# Patient Record
Sex: FEMALE | Race: WHITE | NOT HISPANIC OR LATINO | Employment: PART TIME | URBAN - METROPOLITAN AREA
[De-identification: names, ages, dates, MRNs, and addresses within clinical notes are randomized per-mention and may not be internally consistent; named-entity substitution may affect disease eponyms.]

---

## 2024-04-23 ENCOUNTER — TELEPHONE (OUTPATIENT)
Dept: PRIMARY CARE | Facility: CLINIC | Age: 59
End: 2024-04-23

## 2024-04-23 ENCOUNTER — APPOINTMENT (OUTPATIENT)
Dept: OBSTETRICS AND GYNECOLOGY | Facility: CLINIC | Age: 59
End: 2024-04-23

## 2024-04-23 ENCOUNTER — OFFICE VISIT (OUTPATIENT)
Dept: PRIMARY CARE | Facility: CLINIC | Age: 59
End: 2024-04-23
Payer: COMMERCIAL

## 2024-04-23 VITALS
SYSTOLIC BLOOD PRESSURE: 120 MMHG | OXYGEN SATURATION: 97 % | TEMPERATURE: 97.3 F | WEIGHT: 270.2 LBS | HEART RATE: 76 BPM | DIASTOLIC BLOOD PRESSURE: 74 MMHG

## 2024-04-23 DIAGNOSIS — N93.9 VAGINAL BLEEDING: Primary | ICD-10-CM

## 2024-04-23 PROCEDURE — 1036F TOBACCO NON-USER: CPT | Performed by: NURSE PRACTITIONER

## 2024-04-23 PROCEDURE — 99203 OFFICE O/P NEW LOW 30 MIN: CPT | Performed by: NURSE PRACTITIONER

## 2024-04-23 RX ORDER — PROPRANOLOL HYDROCHLORIDE 10 MG/1
TABLET ORAL
COMMUNITY
Start: 2013-11-25

## 2024-04-23 RX ORDER — LURASIDONE HYDROCHLORIDE 80 MG/1
TABLET, FILM COATED ORAL
COMMUNITY

## 2024-04-23 ASSESSMENT — ENCOUNTER SYMPTOMS
FREQUENCY: 0
MYALGIAS: 0
CHILLS: 0
RECTAL PAIN: 0
RESPIRATORY NEGATIVE: 1
FEVER: 0
DIARRHEA: 0
VOMITING: 0
CARDIOVASCULAR NEGATIVE: 1
CONSTIPATION: 0
HEMATURIA: 0
APPETITE CHANGE: 0
ABDOMINAL PAIN: 0
DYSURIA: 0
HEADACHES: 0
NAUSEA: 0

## 2024-04-23 NOTE — TELEPHONE ENCOUNTER
noted    ----- Message from Mallory Medina sent at 4/23/2024 12:22 PM EDT -----  Rescheduled for 5/2/24 at 8:30am with Lance Hardin.  Called patient and let her know information.  ----- Message -----  From: ALVERTO Mcdonough-CNP  Sent: 4/23/2024  12:08 PM EDT  To: Mallory Medina    They cancelled that appt for today. Can you please schedule her for Roma Hardin next week?

## 2024-04-23 NOTE — PROGRESS NOTES
Subjective   Patient ID: Kavya Stout is a 59 y.o. female who presents for Vaginal Bleeding (Hasn't had a period in 10 years- first time spotting).    Patient states that she noticed a few drops of blood on her panties after going to the bathroom. It has been steady since then. She has needed to put a thin pad on her underwear. Pt changes the pad once a day. No cramping. No recent sexual activity. Patient has no urinary symptoms. Patient reports that she is feeling well otherwise. No family history of any GYN cancers.     Patient's last period was ten years ago.     Review of Systems   Constitutional:  Negative for appetite change, chills and fever.   HENT: Negative.     Respiratory: Negative.     Cardiovascular: Negative.    Gastrointestinal:  Negative for abdominal pain, constipation, diarrhea, nausea, rectal pain and vomiting.   Genitourinary:  Positive for vaginal bleeding. Negative for dysuria, frequency, hematuria, pelvic pain, vaginal discharge and vaginal pain.   Musculoskeletal:  Negative for myalgias.   Neurological:  Negative for headaches.     Objective   /74   Pulse 76   Temp 36.3 °C (97.3 °F)   Wt 123 kg (270 lb 3.2 oz)   SpO2 97%     Physical Exam  Vitals reviewed.   Constitutional:       General: She is not in acute distress.     Appearance: Normal appearance. She is not toxic-appearing.   HENT:      Head: Atraumatic.   Genitourinary:     Labia:         Right: No rash.         Left: No rash.       Comments: Cervix visualized and appears normal. There is copious amount of blood in the vaginal canal   Neurological:      Mental Status: She is alert.     Assessment/Plan   Problem List Items Addressed This Visit    None  Visit Diagnoses         Codes    Vaginal bleeding    -  Primary N93.9    Relevant Orders    US pelvis transvaginal    Urine Culture    Urinalysis with Reflex Microscopic    Urine Culture    Referral to Gynecology        Patient to have US done tomorrow to further evaluate  the reason for bleeding. Patient to do UA and culture done tomorrow when she does her ultrasound. Also ordered CBC and CMP. Referred to GYN and she will see a provider on 5/2/24. Patient advised to go to the ER for any worsening bleeding, abdominal pain, or new/concerning symptoms; she agreed. Will call pt when results become available.

## 2024-04-24 ENCOUNTER — HOSPITAL ENCOUNTER (OUTPATIENT)
Dept: RADIOLOGY | Facility: HOSPITAL | Age: 59
Discharge: HOME | End: 2024-04-24
Payer: COMMERCIAL

## 2024-04-24 DIAGNOSIS — N93.9 VAGINAL BLEEDING: ICD-10-CM

## 2024-04-24 PROCEDURE — 76856 US EXAM PELVIC COMPLETE: CPT

## 2024-04-24 PROCEDURE — 76830 TRANSVAGINAL US NON-OB: CPT | Performed by: RADIOLOGY

## 2024-04-24 PROCEDURE — 76857 US EXAM PELVIC LIMITED: CPT | Performed by: RADIOLOGY

## 2024-04-25 ENCOUNTER — LAB (OUTPATIENT)
Dept: LAB | Facility: LAB | Age: 59
End: 2024-04-25
Payer: COMMERCIAL

## 2024-04-25 ENCOUNTER — TELEPHONE (OUTPATIENT)
Dept: PRIMARY CARE | Facility: CLINIC | Age: 59
End: 2024-04-25

## 2024-04-25 ENCOUNTER — TELEPHONE (OUTPATIENT)
Dept: PRIMARY CARE | Facility: CLINIC | Age: 59
End: 2024-04-25
Payer: COMMERCIAL

## 2024-04-25 DIAGNOSIS — N93.9 VAGINAL BLEEDING: ICD-10-CM

## 2024-04-25 LAB
ALBUMIN SERPL BCP-MCNC: 4.4 G/DL (ref 3.4–5)
ALP SERPL-CCNC: 129 U/L (ref 33–136)
ALT SERPL W P-5'-P-CCNC: 34 U/L (ref 7–45)
ANION GAP SERPL CALC-SCNC: 9 MMOL/L (ref 10–20)
APPEARANCE UR: CLEAR
AST SERPL W P-5'-P-CCNC: 21 U/L (ref 9–39)
BACTERIA #/AREA URNS AUTO: ABNORMAL /HPF
BILIRUB SERPL-MCNC: 0.7 MG/DL (ref 0–1.2)
BILIRUB UR STRIP.AUTO-MCNC: NEGATIVE MG/DL
BUN SERPL-MCNC: 13 MG/DL (ref 6–23)
CALCIUM SERPL-MCNC: 9 MG/DL (ref 8.6–10.3)
CHLORIDE SERPL-SCNC: 104 MMOL/L (ref 98–107)
CO2 SERPL-SCNC: 31 MMOL/L (ref 21–32)
COLOR UR: YELLOW
CREAT SERPL-MCNC: 0.92 MG/DL (ref 0.5–1.05)
EGFRCR SERPLBLD CKD-EPI 2021: 71 ML/MIN/1.73M*2
ERYTHROCYTE [DISTWIDTH] IN BLOOD BY AUTOMATED COUNT: 13.4 % (ref 11.5–14.5)
GLUCOSE SERPL-MCNC: 89 MG/DL (ref 74–99)
GLUCOSE UR STRIP.AUTO-MCNC: NEGATIVE MG/DL
HCT VFR BLD AUTO: 45.7 % (ref 36–46)
HGB BLD-MCNC: 14.8 G/DL (ref 12–16)
KETONES UR STRIP.AUTO-MCNC: NEGATIVE MG/DL
LEUKOCYTE ESTERASE UR QL STRIP.AUTO: NEGATIVE
MCH RBC QN AUTO: 28.1 PG (ref 26–34)
MCHC RBC AUTO-ENTMCNC: 32.4 G/DL (ref 32–36)
MCV RBC AUTO: 87 FL (ref 80–100)
NITRITE UR QL STRIP.AUTO: NEGATIVE
NRBC BLD-RTO: 0 /100 WBCS (ref 0–0)
PH UR STRIP.AUTO: 6 [PH]
PLATELET # BLD AUTO: 277 X10*3/UL (ref 150–450)
POTASSIUM SERPL-SCNC: 4.1 MMOL/L (ref 3.5–5.3)
PROT SERPL-MCNC: 6.3 G/DL (ref 6.4–8.2)
PROT UR STRIP.AUTO-MCNC: NEGATIVE MG/DL
RBC # BLD AUTO: 5.27 X10*6/UL (ref 4–5.2)
RBC # UR STRIP.AUTO: ABNORMAL /UL
RBC #/AREA URNS AUTO: >20 /HPF
SODIUM SERPL-SCNC: 140 MMOL/L (ref 136–145)
SP GR UR STRIP.AUTO: 1
SQUAMOUS #/AREA URNS AUTO: ABNORMAL /HPF
UROBILINOGEN UR STRIP.AUTO-MCNC: <2 MG/DL
WBC # BLD AUTO: 9 X10*3/UL (ref 4.4–11.3)
WBC #/AREA URNS AUTO: ABNORMAL /HPF

## 2024-04-25 PROCEDURE — 80053 COMPREHEN METABOLIC PANEL: CPT

## 2024-04-25 PROCEDURE — 87086 URINE CULTURE/COLONY COUNT: CPT

## 2024-04-25 PROCEDURE — 85027 COMPLETE CBC AUTOMATED: CPT

## 2024-04-25 PROCEDURE — 36415 COLL VENOUS BLD VENIPUNCTURE: CPT

## 2024-04-25 PROCEDURE — 81001 URINALYSIS AUTO W/SCOPE: CPT

## 2024-04-25 NOTE — TELEPHONE ENCOUNTER
Spoke to patient and relayed results of labs and UA. Pt to see OB/gyn on Monday; she got a sooner appt. Pt to follow up with any questions or concerns. Pt to follow up with GYN as scheduled.

## 2024-04-25 NOTE — TELEPHONE ENCOUNTER
Spoke to patient this morning and relayed results of ultrasound. Patient continues to have bleeding. She will get labs/urine done today as the lab closed the other day. Patient to follow up with GYN as scheduled. Will call pt when results of labs become available.    ----- Message from Mallory Medina sent at 4/25/2024 10:13 AM EDT -----  Regarding: Call back  Patient was seen 4-23 and had a transvaginal ultrasound done.  Was returning your call regarding her results.  Please give her a call back at [phone]

## 2024-04-26 LAB — BACTERIA UR CULT: NORMAL

## 2024-04-27 ENCOUNTER — TELEPHONE (OUTPATIENT)
Dept: PRIMARY CARE | Facility: CLINIC | Age: 59
End: 2024-04-27
Payer: COMMERCIAL

## 2024-04-27 NOTE — TELEPHONE ENCOUNTER
Spoke to patient and relayed results of a negative urine culture. Her bleeding has slowed down. She will follow up with GYN on Monday as scheduled

## 2024-04-29 ENCOUNTER — OFFICE VISIT (OUTPATIENT)
Dept: OBSTETRICS AND GYNECOLOGY | Facility: CLINIC | Age: 59
End: 2024-04-29
Payer: COMMERCIAL

## 2024-04-29 VITALS
HEIGHT: 66 IN | WEIGHT: 267.25 LBS | BODY MASS INDEX: 42.95 KG/M2 | SYSTOLIC BLOOD PRESSURE: 130 MMHG | DIASTOLIC BLOOD PRESSURE: 88 MMHG

## 2024-04-29 DIAGNOSIS — N93.9 ABNORMAL UTERINE BLEEDING (AUB): Primary | ICD-10-CM

## 2024-04-29 DIAGNOSIS — Z12.4 CERVICAL CANCER SCREENING: ICD-10-CM

## 2024-04-29 DIAGNOSIS — R93.5 ABNORMAL ULTRASOUND OF ENDOMETRIUM: ICD-10-CM

## 2024-04-29 DIAGNOSIS — E66.01 OBESITY, CLASS III, BMI 40-49.9 (MORBID OBESITY) (MULTI): ICD-10-CM

## 2024-04-29 DIAGNOSIS — N84.0 ENDOMETRIAL POLYP: ICD-10-CM

## 2024-04-29 PROCEDURE — 88175 CYTOPATH C/V AUTO FLUID REDO: CPT

## 2024-04-29 PROCEDURE — 58558 HYSTEROSCOPY BIOPSY: CPT | Performed by: OBSTETRICS & GYNECOLOGY

## 2024-04-29 PROCEDURE — 88141 CYTOPATH C/V INTERPRET: CPT | Performed by: PATHOLOGY

## 2024-04-29 PROCEDURE — 99204 OFFICE O/P NEW MOD 45 MIN: CPT | Performed by: OBSTETRICS & GYNECOLOGY

## 2024-04-29 PROCEDURE — 88305 TISSUE EXAM BY PATHOLOGIST: CPT | Performed by: PATHOLOGY

## 2024-04-29 PROCEDURE — 1036F TOBACCO NON-USER: CPT | Performed by: OBSTETRICS & GYNECOLOGY

## 2024-04-29 PROCEDURE — 88305 TISSUE EXAM BY PATHOLOGIST: CPT

## 2024-04-29 PROCEDURE — 87624 HPV HI-RISK TYP POOLED RSLT: CPT

## 2024-04-29 ASSESSMENT — PATIENT HEALTH QUESTIONNAIRE - PHQ9
1. LITTLE INTEREST OR PLEASURE IN DOING THINGS: NOT AT ALL
2. FEELING DOWN, DEPRESSED OR HOPELESS: NOT AT ALL
SUM OF ALL RESPONSES TO PHQ9 QUESTIONS 1 AND 2: 0

## 2024-04-29 ASSESSMENT — ENCOUNTER SYMPTOMS
OCCASIONAL FEELINGS OF UNSTEADINESS: 0
LOSS OF SENSATION IN FEET: 0
DEPRESSION: 0

## 2024-04-29 NOTE — PROGRESS NOTES
Patient ID: Kavya Stout is a 59 y.o. female for office hysteroscopy with endometrial sampling for PMB and thickened endometrium on ultrasound.      Procedures    HYSTEROSCOPY WITH POLYPECTOMY OFFICE PROCEDURE NOTE    Patient's pre-procedure questions were answered and a written informed consent form was signed.   Patient was placed in lithotomy position on the procedure room table.  A speculum was placed in the vagina. The cervix was cleansed × 3 with Betadine.  A single-tooth tenaculum was placed on the anterior lip of the cervix.  A paracervical block of 10 mL of 0.25% marcaine was placed.   Dilatation of the cervix was NOT needed to be able to pass the hysteroscope.  The Aveta Glasco hysteroscope was then inserted into the endometrial cavity, normal saline was used for the hysteroscopic fluid medium.  The cavity distended well with evidence of endometrial polyps.  The background endometrium appeared normal but not atrophic.  The resectoscope was placed through the hysteroscope and the polyps were then removed, as was background endometrial tissue. Procedure was then ended, the instruments removed from the uterus and vagina. The tenaculum site was hemostatic. The patient tolerated the procedure well.  Blood loss was minimal.    F/U in 2 to 3 weeks for virtual visit to discuss PATH results.      Lance Hardin DO

## 2024-04-29 NOTE — PROGRESS NOTES
"GYN PROGRESS NOTE          CC:     Chief Complaint   Patient presents with    Procedure     Hysteroscopy  Bleeding started 4/21/2024 and stopped 4/27/2024. She states that she was bleeding lightly, but enough to fill a panty liner 2x in one day. No pain.   Last pap 9/21/2017 normal  Hx of right breast biposy        HPI:  Kavya Stout is scheduled today for evaluation of postmenopausal AUB. She started having bleeding on April 20 and bled for a week.  The bleeding was light and required her to change her pain on a twice a day, she had no heavy bleeding and no pain with this.  She is postmenopausal as of ~10 years ago.  Bleeding is vaginal, not GI or urologic in origin.  She is not on any hormonal Rx or blood thinners.  She has no history of HGSIL, her last pap/HPV were wnl in 2017.        ROS:  URO - no hematuria  GI - no blood in BMs  GYN - see HPI        HISTORY:  Past Surgical History:   Procedure Laterality Date    BREAST BIOPSY       Cancer-related family history includes Breast cancer in her mother.       PHYSICAL EXAM:  /88   Ht 1.676 m (5' 6\")   Wt 121 kg (267 lb 4 oz)   BMI 43.14 kg/m²   GEN:  A&O, NAD  HEENT:  head HC/AT, no visible goiter  ABD:  NT/ND, soft, no palpable masses, morbidly obese  URO:  atrophic urethral meatus, no bladder TTP  GYN:  atrophic vulva and perineum w/o lesions or ulcers, atrophic vagina without discharge or lesions--no blood in vaginal vault, normal cervix without lesions or discharge or CMT--no bleeding from os, uterus NT/NE, adnexa mobile and NT/NE  PSYCH:  normal affect, non-anxious      LAB RESULTS:  9/21/17 - pap/HPV wnl  9/10/13 - pap wnl      IMAGING RESULTS:    === 04/24/24 ===    US PELVIS TRANSABDOMINAL WITH TRANSVAGINAL    - Impression -  The endometrial thickness measures 9 mm. In a postmenopausal patient,  differential considerations include endometrial hyperplasia or  neoplasm. A polyp is considered less likely, given diffuse  thickening. Gyn follow-up " is recommended.    Mildly heterogeneous myometrium and indistinct myometrial/endometrial  junction. Findings are nonspecific but may be seen with adenomyosis.    The ovaries are not visualized.    I personally reviewed the pelvic ultrasound images and my impressions are is heterogenous thickened endometrium measuring 9 mm, ovaries not visualized       IMPRESSION/PLAN:    Problem List Items Addressed This Visit    None  Visit Diagnoses       Abnormal uterine bleeding (AUB)    -  Primary    Relevant Orders    Surgical Pathology Exam    Abnormal ultrasound of endometrium        Endometrial polyp        Cervical cancer screening        Relevant Orders    THINPREP PAP    Obesity, Class III, BMI 40-49.9 (morbid obesity) (Multi)              AUB/PMB:  Potential causes for PMB discussed--atrophic endometrium, endometrial polyps, endometrial hyperplasia, and endometrial cancer.  Workup reviewed, including pelvic US and endometrial sampling with a directed biopsy via office hysteroscopy along with EMB.  US with thickened endometrium.  Pap and HPV done today as no testing since 2017.  Endometrial hyperplasia/cancer risk factors include morbid obesity.  Hysteroscopy done today reveals evidence of endometrial polyps, hysteroscopic polypectomy done today without complication, postprocedure instructions reviewed.  AUB precautions reviewed.    Pap/HPV done today, no Hx of HGSIL, next pap/HPV due in 3 years (2027) due to lack of recent adequate cervical CA screening.  If repeat co-testing in 3 years is normal patient can stop screening as she will > 65 by the time next testing would be due.  ASCCP pap smear screening guidelines reviewed with the patient.  Guidelines that call to stop pap smear screening > age 65 if 3 normal cotestings in the last 10 years discussed with patient.      Lance Hardin,

## 2024-05-02 ENCOUNTER — APPOINTMENT (OUTPATIENT)
Dept: OBSTETRICS AND GYNECOLOGY | Facility: CLINIC | Age: 59
End: 2024-05-02
Payer: COMMERCIAL

## 2024-05-06 LAB
LABORATORY COMMENT REPORT: NORMAL
PATH REPORT.FINAL DX SPEC: NORMAL
PATH REPORT.GROSS SPEC: NORMAL
PATH REPORT.RELEVANT HX SPEC: NORMAL
PATH REPORT.TOTAL CANCER: NORMAL

## 2024-05-07 ENCOUNTER — HOSPITAL ENCOUNTER (OUTPATIENT)
Dept: RADIOLOGY | Facility: CLINIC | Age: 59
Discharge: HOME | End: 2024-05-07
Payer: COMMERCIAL

## 2024-05-07 DIAGNOSIS — Z12.31 ENCOUNTER FOR SCREENING MAMMOGRAM FOR MALIGNANT NEOPLASM OF BREAST: ICD-10-CM

## 2024-05-07 PROCEDURE — 77063 BREAST TOMOSYNTHESIS BI: CPT | Performed by: RADIOLOGY

## 2024-05-07 PROCEDURE — 77067 SCR MAMMO BI INCL CAD: CPT

## 2024-05-07 PROCEDURE — 77067 SCR MAMMO BI INCL CAD: CPT | Performed by: RADIOLOGY

## 2024-05-07 NOTE — RESULT ENCOUNTER NOTE
Can you follow-up with patient next week and make sure that she has been contacted by radiology regarding the need for additional breast imaging?  Her current mammogram is category 0.

## 2024-05-08 ENCOUNTER — TELEPHONE (OUTPATIENT)
Dept: OBSTETRICS AND GYNECOLOGY | Facility: CLINIC | Age: 59
End: 2024-05-08
Payer: COMMERCIAL

## 2024-05-08 DIAGNOSIS — R92.8 ABNORMAL FINDING ON MAMMOGRAPHY: ICD-10-CM

## 2024-05-08 NOTE — TELEPHONE ENCOUNTER
----- Message from Lance Hardin DO sent at 5/7/2024  1:55 PM EDT -----  Can you follow-up with patient next week and make sure that she has been contacted by radiology regarding the need for additional breast imaging?  Her current mammogram is category 0.

## 2024-05-08 NOTE — TELEPHONE ENCOUNTER
Spoke with patient and made her aware regarding results and recommendation for further images, also gave her the phone number to call if she does not hear from them by next week.  Order placed in the system for Diag. Testing.  Reviewed and approved by ELISEO YANES on 5/8/24 at 11:59 AM.

## 2024-05-13 LAB
CYTOLOGY CMNT CVX/VAG CYTO-IMP: NORMAL
HPV HR 12 DNA GENITAL QL NAA+PROBE: NEGATIVE
HPV HR GENOTYPES PNL CVX NAA+PROBE: NEGATIVE
HPV16 DNA SPEC QL NAA+PROBE: NEGATIVE
HPV18 DNA SPEC QL NAA+PROBE: NEGATIVE
LAB AP HPV GENOTYPE QUESTION: YES
LAB AP HPV HR: NORMAL
LABORATORY COMMENT REPORT: NORMAL
PATH REPORT.TOTAL CANCER: NORMAL

## 2024-05-14 ENCOUNTER — APPOINTMENT (OUTPATIENT)
Dept: PRIMARY CARE | Facility: CLINIC | Age: 59
End: 2024-05-14
Payer: COMMERCIAL

## 2024-05-21 ENCOUNTER — TELEMEDICINE (OUTPATIENT)
Dept: OBSTETRICS AND GYNECOLOGY | Facility: CLINIC | Age: 59
End: 2024-05-21
Payer: COMMERCIAL

## 2024-05-21 DIAGNOSIS — R87.618 UNEXPLAINED ENDOMETRIAL CELLS ON CERVICAL PAP SMEAR: Primary | ICD-10-CM

## 2024-05-21 DIAGNOSIS — N93.9 ABNORMAL UTERINE BLEEDING (AUB): ICD-10-CM

## 2024-05-21 DIAGNOSIS — R93.5 ABNORMAL ULTRASOUND OF ENDOMETRIUM: ICD-10-CM

## 2024-05-21 DIAGNOSIS — R92.8 ABNORMAL MAMMOGRAM: ICD-10-CM

## 2024-05-21 PROCEDURE — 99442 PR PHYS/QHP TELEPHONE EVALUATION 11-20 MIN: CPT | Performed by: OBSTETRICS & GYNECOLOGY

## 2024-05-21 NOTE — PROGRESS NOTES
GYNECOLOGY VIRTUAL VISIT PROGRESS NOTE          CC:     Chief Complaint   Patient presents with    Follow-up     EMB results        HPI:  Kavya Stout is scheduled today for virtual visit to discuss test results.   Audio-only communication was utilized per patient request and verbal consent was obtained for the encounter.    Patient did okay after the office hysteroscopic procedure.  She had a little bit of spotting but no pain or fevers.  No bleeding since then.  No new GYN complaints.      ROS:  GYN - see HPI      PHYSICAL EXAM:  n/a      IMPRESSION/PLAN:    Problem List Items Addressed This Visit       Abnormal uterine bleeding (AUB)     Other Visit Diagnoses       Unexplained endometrial cells on cervical Pap smear    -  Primary    Abnormal ultrasound of endometrium        Abnormal mammogram              AUB/PMB:  Resolved.  Only had 1 episode 4/2024.  US with thickened endometrium.  S/P hysteroscopic polypectomy/EMB.  Path results benign--reviewed with patient (polyp not identified by pathologist, but clinically was a polyp at office H/S).  No further workup or treatment needed.    AUB precautions reviewed.    Pap/HPV results wnl with + endometrial cells--related to endometrial pathology and EMB benign, next pap/HPV due in 3 years (as no prior pap for 7 years before this recent pap) and then can stop pap screening altogether > age 65 as no Hx of HGSIL.       Abnormal mammogram:  Screening mammogram=category 0.  Has diagnostic imaging scheduled 6/5.    Time spent:  12 minutes       F/U PRN.      Lance Hardin DO

## 2024-06-05 ENCOUNTER — HOSPITAL ENCOUNTER (OUTPATIENT)
Dept: RADIOLOGY | Facility: HOSPITAL | Age: 59
Discharge: HOME | End: 2024-06-05
Payer: COMMERCIAL

## 2024-06-05 DIAGNOSIS — R92.8 ABNORMAL FINDING ON MAMMOGRAPHY: ICD-10-CM

## 2024-06-05 PROCEDURE — 77065 DX MAMMO INCL CAD UNI: CPT | Mod: LEFT SIDE | Performed by: STUDENT IN AN ORGANIZED HEALTH CARE EDUCATION/TRAINING PROGRAM

## 2024-06-05 PROCEDURE — 76642 ULTRASOUND BREAST LIMITED: CPT | Mod: LEFT SIDE | Performed by: STUDENT IN AN ORGANIZED HEALTH CARE EDUCATION/TRAINING PROGRAM

## 2024-06-05 PROCEDURE — 77061 BREAST TOMOSYNTHESIS UNI: CPT | Mod: LT

## 2024-06-05 PROCEDURE — 76642 ULTRASOUND BREAST LIMITED: CPT | Mod: LT

## 2024-06-05 PROCEDURE — 77061 BREAST TOMOSYNTHESIS UNI: CPT | Mod: LEFT SIDE | Performed by: STUDENT IN AN ORGANIZED HEALTH CARE EDUCATION/TRAINING PROGRAM

## 2024-06-05 PROCEDURE — 76982 USE 1ST TARGET LESION: CPT | Mod: LT

## 2024-06-06 ENCOUNTER — TELEPHONE (OUTPATIENT)
Dept: OBSTETRICS AND GYNECOLOGY | Facility: CLINIC | Age: 59
End: 2024-06-06
Payer: COMMERCIAL

## 2024-06-06 NOTE — TELEPHONE ENCOUNTER
----- Message from Lance Hardin DO sent at 6/5/2024 10:11 PM EDT -----  Patient has a abnormal mammogram and needs a biopsy. Looking at the report it looks like they set it up?  Please contact the patient to make sure that she is set up for a ultrasound guided biopsy. If she's not then she needs to referred to a breast surgeon for a biopsy.

## 2024-06-20 PROBLEM — R92.8 ABNORMAL FINDING ON BREAST IMAGING: Status: ACTIVE | Noted: 2024-06-20

## 2024-06-20 PROBLEM — R92.1 BREAST CALCIFICATION, LEFT: Status: ACTIVE | Noted: 2024-06-20

## 2024-06-20 PROBLEM — N63.20 MASS OF LEFT BREAST: Status: ACTIVE | Noted: 2024-06-20

## 2024-06-20 NOTE — PROGRESS NOTES
[facility]  Kavya Stout female   1965 59 y.o.  [id number]      Chief Complaint  New patient, biopsy consultation.    History Of Present Illness  Kavya Stout is a very pleasant 59 y.o.  female seen in the breast center for biopsy consultation. She denies breast surgery. She has a history of a benign right breast core biopsy with FEA. She has family history of breast cancer in her mother, age 60+.    BREAST IMAGIN2024 Left diagnostic mammogram with ultrasound, indicates BI-RADS Category 4. Suspicious left breast calcifications without axillary lymphadenopathy. Further evaluation with surgical consultation and stereotactic-guided biopsy is recommended. Probably benign left breast mass likely a complicated cyst corresponding to the finding questioned on screening. Short-term imaging follow-up is recommended with ultrasound in 6 months to assess stability, pending the results of biopsy.    REPRODUCTIVE HISTORY:  menarche age 14, , first birth age 26,  x 21 months, OCP's x 4-5 years, natural menopause age 53, no HRT, scattered fibroglandular tissue    FAMILY CANCER HISTORY:   Mother: Breast cancer, age 60+    Review of Systems  Constitutional:  Negative for appetite change, fatigue, fever and unexpected weight change.   HENT:  Negative for ear pain, hearing loss, nosebleeds, sore throat and trouble swallowing.    Eyes:  Negative for discharge, itching and visual disturbance.   Breast: As stated in HPI.  Respiratory:  Negative for cough, chest tightness and shortness of breath.    Cardiovascular:  Negative for chest pain, palpitations and leg swelling.   Gastrointestinal:  Negative for abdominal pain, constipation, diarrhea and nausea.   Endocrine: Negative for cold intolerance and heat intolerance.   Genitourinary:  Negative for dysuria, frequency, hematuria, pelvic pain and vaginal bleeding.   Musculoskeletal:  Negative for arthralgias, back pain, gait problem,  joint swelling and myalgias.   Skin:  Negative for color change and rash.   Allergic/Immunologic: Negative for environmental allergies and food allergies.   Neurological:  Negative for dizziness, tremors, speech difficulty, weakness, numbness and headaches.   Hematological:  Does not bruise/bleed easily.   Psychiatric/Behavioral:  Negative for agitation, dysphoric mood and sleep disturbance. The patient is not nervous/anxious.       Past Medical History  She has a past medical history of Anxiety.    Surgical History  She has a past surgical history that includes Breast biopsy.    Family History  Cancer-related family history includes Breast cancer in her mother.     Social History  She reports that she has never smoked. She has never used smokeless tobacco. She reports current alcohol use of about 1.0 standard drink of alcohol per week. She reports that she does not use drugs.    Allergies  Patient has no known allergies.    Medications  Current Outpatient Medications   Medication Instructions    lurasidone (Latuda) 80 mg tablet oral, Daily RT    propranolol (Inderal) 10 mg tablet oral       Last Recorded Vitals  Vitals:    06/21/24 1233   BP: 132/86   Pulse: 86   Resp: 16       Physical Exam  Patient is alert and oriented x3 and in a relaxed and appropriate mood. Her gait is steady and hand grasps are equal. Sclera is clear. The breasts are nearly symmetrical. The tissue is soft without palpable abnormalities, discrete nodules or masses. The skin and nipples appear normal. There is no cervical, supraclavicular or axillary lymphadenopathy.        Relevant Results and Imaging  BI mammo left diagnostic tomosynthesis 06/05/2024  BI US breast limited left 06/05/2024    Narrative  Interpreted By:  Yanick Walker,  STUDY:  BI MAMMO LEFT DIAGNOSTIC TOMOSYNTHESIS; BI US BREAST LIMITED LEFT;  6/5/2024 4:19 pm; 6/5/2024 4:44 pm    ACCESSION NUMBER(S):  [id number]; [id number]    ORDERING CLINICIAN:  AP  MCKENZIE    INDICATION:  Callback from screening mammography for a focal asymmetry in the left  breast. History of prior biopsy of right breast calcifications in  2014 showing flat epithelial atypia and fibrocystic changes.    COMPARISON:  05/07/2024, 02/25/2014, 11/29/2013, 09/20/2013, 12/07/2011.    FINDINGS:  MAMMOGRAPHY: 2D and tomosynthesis images were reviewed at 1 mm slice  thickness. Additional spot compression tomosynthesis and spot  magnification views were obtained.    Density:  There are areas of scattered fibroglandular tissue.    The focal asymmetry questioned on screening mammogram in lower inner  left breast at middle depth persists as an oval mass with  circumscribed margins. There are additional small oval circumscribed  masses in the left breast similar to the contralateral breast seen on  screening mammogram.    Multiple adjacent groups of faint amorphous calcifications are seen  in superior central left breast at middle depth spanning up to 4.3  cm. These calcifications are better appreciated on the CC  magnification views and tomosynthesis views.    ULTRASOUND: Targeted ultrasound with elastography was performed by a  registered sonographer in the lower-inner quadrant of the left breast  and left axilla. An oval parallel hypoechoic mass with circumscribed  margins is identified in the left breast at 8:30 position, 3 cm from  the nipple measuring 0.6 x 0.3 x 0.5 cm. No internal vascularity is  identified. The mass is soft on elastography. This likely represents  a complicated cyst and corresponds with the finding questioned on  screening mammogram.    Scanning of the left axilla demonstrates 4 morphologically normal  lymph nodes with preserved fatty hilum and normal cortical thickness.    Impression  1. Suspicious left breast calcifications without axillary  lymphadenopathy. Further evaluation with surgical consultation and  stereotactic-guided biopsy is recommended.    2. Probably benign left  breast mass likely a complicated cyst  corresponding to the finding questioned on screening. Short-term  imaging follow-up is recommended with ultrasound in 6 months to  assess stability, pending the results of biopsy.    Dr. Yanick Walker discussed the findings and recommendations with  the patient at the time of exam. A message was sent to the referring  clinician at the time of this dictation regarding these findings  using the Epic critical findings reporting system. A pre-procedure  form was filled out.    Method of Detection: Category Sdbt - 3D Screening    BI-RADS CATEGORY:  BI-RADS Category:  4 Suspicious.  Recommendation:  Surgical Consultation and Biopsy.  Recommended Date:  Immediate.  Laterality:  Left.    For any future breast imaging appointments, please call [phone]).      MACRO:  Critical Finding:  Breast Imaging Abnormality. Notification was  initiated on 6/5/2024 at 4:42 pm by  Yanick Walker.  (**-YCF-**)  Instructions:  Surgical Consultation and Imaging Guided Biopsy.    Signed by: Yanick Walker 6/5/2024 4:47 PM  Dictation workstation:   [id number]      I explained the results in depth, along with suggested explanation for follow up recommendations based on the testing results. BI-RADS Category 4        Visit Diagnosis  1. Abnormal finding on breast imaging        2. Breast calcification, left        3. Mass of left breast, unspecified quadrant            Assessment/Plan  Abnormal mammogram, indeterminate left breast calcifications, probably benign left breast mass, no breast surgery, history benign right core biopsy with FEA, family history of breast cancer, scattered fibroglandular tissue    Plan:  Left breast stereotactic guided core biopsy. Return December 2024 for left breast ultrasound and office visit.     Patient Discussion/Summary  Proceed to biopsy. A breast radiology physician will perform the biopsy. Results are usually available in about 7 business days. I will  call patient with results and instruct on next steps and plan.     IMPORTANT INFORMATION REGARDING YOUR RESULTS    If you receive medical information from My [facility] Personal Health Record (online chart) your results will be released into your chart. This means you may view or see results of your biopsy or procedure before I contact you directly. If this occurs, please call the office and we will discuss your results over the phone.    You can see your health information, review clinical summaries from office visits & test results online when you follow your health with MY  Chart, a personal health record. To sign up go to [email or web address]. If you need assistance with signing up or trouble getting into your account call [facility] Patient Line 24/7 at [phone].    My office phone number is [phone]  if you need to get in touch with me or have additional questions or concerns. Thank you for choosing [facility] and trusting me as your healthcare provider. I look forward to seeing you again at your next office visit. I am honored to be a provider on your health care team and I remain dedicated to helping you achieve your health goals.       Tracy Timmons, APRN-CNP

## 2024-06-21 ENCOUNTER — PROCEDURE VISIT (OUTPATIENT)
Dept: SURGICAL ONCOLOGY | Facility: HOSPITAL | Age: 59
End: 2024-06-21
Payer: COMMERCIAL

## 2024-06-21 ENCOUNTER — HOSPITAL ENCOUNTER (OUTPATIENT)
Dept: RADIOLOGY | Facility: HOSPITAL | Age: 59
Discharge: HOME | End: 2024-06-21
Payer: COMMERCIAL

## 2024-06-21 VITALS
DIASTOLIC BLOOD PRESSURE: 86 MMHG | RESPIRATION RATE: 16 BRPM | BODY MASS INDEX: 42.91 KG/M2 | WEIGHT: 267 LBS | HEIGHT: 66 IN | HEART RATE: 86 BPM | SYSTOLIC BLOOD PRESSURE: 132 MMHG

## 2024-06-21 DIAGNOSIS — R92.1 BREAST CALCIFICATION, LEFT: ICD-10-CM

## 2024-06-21 DIAGNOSIS — R92.8 ABNORMAL FINDING ON BREAST IMAGING: Primary | ICD-10-CM

## 2024-06-21 DIAGNOSIS — R92.8 ABNORMAL MAMMOGRAM: ICD-10-CM

## 2024-06-21 DIAGNOSIS — N63.20 MASS OF LEFT BREAST, UNSPECIFIED QUADRANT: ICD-10-CM

## 2024-06-21 PROCEDURE — 2500000005 HC RX 250 GENERAL PHARMACY W/O HCPCS: Performed by: NURSE PRACTITIONER

## 2024-06-21 PROCEDURE — 99214 OFFICE O/P EST MOD 30 MIN: CPT | Performed by: NURSE PRACTITIONER

## 2024-06-21 PROCEDURE — 19081 BX BREAST 1ST LESION STRTCTC: CPT | Mod: LT

## 2024-06-21 PROCEDURE — 77065 DX MAMMO INCL CAD UNI: CPT | Mod: LT

## 2024-06-21 PROCEDURE — 99204 OFFICE O/P NEW MOD 45 MIN: CPT | Performed by: NURSE PRACTITIONER

## 2024-06-21 PROCEDURE — 2780000003 HC OR 278 NO HCPCS

## 2024-06-21 PROCEDURE — 2720000007 HC OR 272 NO HCPCS

## 2024-06-21 PROCEDURE — A4648 IMPLANTABLE TISSUE MARKER: HCPCS

## 2024-06-27 ENCOUNTER — TELEPHONE (OUTPATIENT)
Dept: SURGICAL ONCOLOGY | Facility: HOSPITAL | Age: 59
End: 2024-06-27
Payer: COMMERCIAL

## 2024-06-27 NOTE — TELEPHONE ENCOUNTER
"Result Communication    Spoke with Kavya Villajohnny regarding breast biopsy results showing atypia. Office will call to schedule a surgical consultation for excision.    Resulted Orders   Surgical Pathology Exam   Result Value Ref Range    Case Report       Surgical Pathology                                Case: [id number]                                  Authorizing Provider:  BOB Keita    Collected:           06/21/2024 1335              Ordering Location:     [facility] Received:            06/21/2024 1535              Pathologist:           Ghazala Robles MD PhD                                                               Specimen:    BREAST CORE BIOPSY LEFT, Left Breast Calcifications Superior Central Mid Depth             FINAL DIAGNOSIS       A. BREAST, LEFT, CORE BIOPSY:      -- Atypical ductal hyperplasia with microcalcifications, see note.  -- Microcalcifications in benign mammary glands and stroma.    Note: Deeper levels of sections were reviewed.  This case was reviewed at breast pathology consensus conference of [facility] on June 27, 2024 by Zoom              By the signature on this report, the individual or group listed as making the Final Interpretation/Diagnosis certifies that they have reviewed this case.       Clinical History       Left Breast Calcifications Superior Central Mid Depth - 8 Cores - Barrel Hydromark Placed      Gross Description       A: Received in formalin, labeled with the patient's name and hospital number and \"left breast calcifications\", are multiple irregular/cylindrical segments of yellow-white fatty soft tissue aggregating to 3.4 x 1.9 x 0.5 cm.  The specimen is submitted in toto in two cassettes.  SMS    NOTE:  Ischemia time: 6/21/2024 1:35 PM.  This specimen was placed into formalin at: 6/21/2024 1:40 PM.         3:16 PM    " CHW followed up with the patient in the community and provided a food bag from [employer] food pantry. Pt reports that he has no further needs from [facility] at this time but will reach out if needed. CHW provided hard copy of [facility], [facility], [facility] [employer] [facility], and senior services in [facility]. Pt has no other questions.     Community Health Worker Intake  • Social determinates of health intake complete.    • Identified barriers to none,   • Contact information provided to Benito Persaud   • Outpatient assessment completed.    Plan: CHW will remove the patient from CCM caseload.

## 2024-07-01 NOTE — PROGRESS NOTES
BREAST SURGERY NEW PATIENT    Assessment/Plan     Left breast ADH  -offered excisional biopsy to rule out upgrade to underlying malignancy.    2. Probably  benign left breast complicated cyst   -6 month follow up U/S.    Atypical hyperplasia is a high risk benign lesion and accounts for 10% of benign breast biopsies.  Atypical hyperplasia is risk factor for breast cancer.  Long term data has shown atypical hyperplasia confers a cumulative risk over a woman's lifetime: 7% risk within 5 years, 13 % risk after 10 years and up to 30% risk within 25 years after diagnosis.      Surgical excision of the area involved is indicated as cancer may be found on final pathology. If the excisional biopsy is benign, risk calculation will be performed to determine if increased surveillance is warranted.      Most recent data suggests women with atypical hyperplasia would substantially benefit from taking a 5 year course of estrogen blocking medications which decreases the risk of developing estrogen receptor positive breast cancer.    Kavya would like to speak with her  prior to making a decision regarding surgical excision. We discussed possible surgical dates should she elect to proceed with surgery.  Her daughter is getting  7/13/2024 and would prefer a date after this.  Offered her 7/31/2024 as an option.  She knows to call our office once she's made a decision.      Subjective   Kavya Stout is a 59 y.o. female who presents today for surgical evaluation of atypia.      Menarche: 14  AFLB: 26  Menopause: 53  HRT: no  Previous biopsies: Yes - benign right core bx- FEA  Previous breast surgery: No  Prior breast cancer: No    Family history: mother with breast cancer in her 60's    Review of Systems   Constitutional symptoms: Denies generalized fatigue.  Denies weight change, fevers/chills, difficulty sleeping   Eyes: Denies double vision, glaucoma, cataracts.  Ear/nose/throat/mouth: Denies hearing changes,  sore throat, sinus problems.  Cardiovascular: No chest pain.  Denies irregular heartbeat.  Denies ankle swelling.  Respiratory: No wheezing, cough, or shortness of breath.  Gastrointestinal: No abdominal pain,  No nausea/vomiting.  No indigestion/heartburn.  No change in bowel habits.  No constipation or diarrhea.   Genitourinary: No urinary incontinence.  No urinary frequency.  No painful urination.  Musculoskeletal: No bone pain, no muscle pain, no joint pain.   Integumentary: No rash. No masses.  No changes in moles.  No easy bruising.  Neurological: No headaches.  No tremors. No numbness/tingling.  Psychiatric: No anxiety. No depression.  Endocrine: No excessive thirst.  Not too hot or too cold.  Not tired or fatigued.    Hematological/lymphatic: No swollen glands or blood clotting problems.  No bruising.    Objective   Physical Exam  General: Alert and oriented x 3.  Mood and affect are appropriate.  HEENT: EOMI, PERRLA.   Neck: supple, no masses, no cervical adenopathy.  Cardiovascular: no lower extremity edema.  Pulmonary: breathing non labored on room air.  GI: Abdomen soft, no masses. No hepatomegaly or splenomegaly.  Lymph nodes: No supraclavicular or axillary adenopathy bilaterally.  Musculoskeletal: Full range of motion in the upper extremities bilaterally.  Neuro: denies dizziness, tremors    Breasts: The breasts were examined in both the seated and supine positions.    RIGHT: The nipple is everted without nipple discharge.  There are no skin changes, skin thickening, or dimpling. There are no masses palpated in the RIGHT breast.   LEFT: The nipple is everted without nipple discharge.  There are no skin changes, skin thickening, or dimpling. There are no masses palpated in the LEFT breast.     Radiology review: All images and reports were personally reviewed.         Vicki Jiang DO

## 2024-07-02 ENCOUNTER — OFFICE VISIT (OUTPATIENT)
Dept: SURGICAL ONCOLOGY | Facility: CLINIC | Age: 59
End: 2024-07-02
Payer: COMMERCIAL

## 2024-07-02 DIAGNOSIS — N60.92 ATYPICAL DUCTAL HYPERPLASIA OF LEFT BREAST: ICD-10-CM

## 2024-07-02 PROCEDURE — 99214 OFFICE O/P EST MOD 30 MIN: CPT | Performed by: SURGERY

## 2024-07-02 PROCEDURE — 99204 OFFICE O/P NEW MOD 45 MIN: CPT | Performed by: SURGERY

## 2024-07-08 DIAGNOSIS — N60.92 ATYPICAL DUCTAL HYPERPLASIA OF LEFT BREAST: ICD-10-CM

## 2024-07-23 ENCOUNTER — LAB (OUTPATIENT)
Dept: LAB | Facility: LAB | Age: 59
End: 2024-07-23
Payer: COMMERCIAL

## 2024-07-23 ENCOUNTER — HOSPITAL ENCOUNTER (OUTPATIENT)
Dept: RADIOLOGY | Facility: HOSPITAL | Age: 59
Discharge: HOME | End: 2024-07-23
Payer: COMMERCIAL

## 2024-07-23 ENCOUNTER — PRE-ADMISSION TESTING (OUTPATIENT)
Dept: PREADMISSION TESTING | Facility: HOSPITAL | Age: 59
End: 2024-07-23
Payer: COMMERCIAL

## 2024-07-23 VITALS
TEMPERATURE: 98.8 F | OXYGEN SATURATION: 97 % | HEART RATE: 81 BPM | WEIGHT: 266.32 LBS | HEIGHT: 66 IN | RESPIRATION RATE: 14 BRPM | BODY MASS INDEX: 42.8 KG/M2 | DIASTOLIC BLOOD PRESSURE: 76 MMHG | SYSTOLIC BLOOD PRESSURE: 133 MMHG

## 2024-07-23 DIAGNOSIS — N60.92 ATYPICAL DUCTAL HYPERPLASIA OF LEFT BREAST: ICD-10-CM

## 2024-07-23 DIAGNOSIS — Z01.818 PRE-OP TESTING: Primary | ICD-10-CM

## 2024-07-23 LAB
ANION GAP SERPL CALC-SCNC: 10 MMOL/L (ref 10–20)
ATRIAL RATE: 77 BPM
BUN SERPL-MCNC: 14 MG/DL (ref 6–23)
CALCIUM SERPL-MCNC: 9.2 MG/DL (ref 8.6–10.3)
CHLORIDE SERPL-SCNC: 104 MMOL/L (ref 98–107)
CO2 SERPL-SCNC: 31 MMOL/L (ref 21–32)
CREAT SERPL-MCNC: 0.88 MG/DL (ref 0.5–1.05)
EGFRCR SERPLBLD CKD-EPI 2021: 75 ML/MIN/1.73M*2
ERYTHROCYTE [DISTWIDTH] IN BLOOD BY AUTOMATED COUNT: 13.2 % (ref 11.5–14.5)
GLUCOSE SERPL-MCNC: 68 MG/DL (ref 74–99)
HCT VFR BLD AUTO: 44.5 % (ref 36–46)
HGB BLD-MCNC: 14.3 G/DL (ref 12–16)
MCH RBC QN AUTO: 28.4 PG (ref 26–34)
MCHC RBC AUTO-ENTMCNC: 32.1 G/DL (ref 32–36)
MCV RBC AUTO: 88 FL (ref 80–100)
NRBC BLD-RTO: 0 /100 WBCS (ref 0–0)
P AXIS: 20 DEGREES
P OFFSET: 194 MS
P ONSET: 149 MS
PLATELET # BLD AUTO: 249 X10*3/UL (ref 150–450)
POTASSIUM SERPL-SCNC: 4.5 MMOL/L (ref 3.5–5.3)
PR INTERVAL: 150 MS
Q ONSET: 224 MS
QRS COUNT: 13 BEATS
QRS DURATION: 88 MS
QT INTERVAL: 384 MS
QTC CALCULATION(BAZETT): 434 MS
QTC FREDERICIA: 417 MS
R AXIS: -12 DEGREES
RBC # BLD AUTO: 5.04 X10*6/UL (ref 4–5.2)
SODIUM SERPL-SCNC: 140 MMOL/L (ref 136–145)
T AXIS: 42 DEGREES
T OFFSET: 416 MS
VENTRICULAR RATE: 77 BPM
WBC # BLD AUTO: 8.9 X10*3/UL (ref 4.4–11.3)

## 2024-07-23 PROCEDURE — 80048 BASIC METABOLIC PNL TOTAL CA: CPT

## 2024-07-23 PROCEDURE — 19285 PERQ DEV BREAST 1ST US IMAG: CPT | Mod: LT

## 2024-07-23 PROCEDURE — 77065 DX MAMMO INCL CAD UNI: CPT | Mod: LT

## 2024-07-23 PROCEDURE — 2500000005 HC RX 250 GENERAL PHARMACY W/O HCPCS: Performed by: RADIOLOGY

## 2024-07-23 PROCEDURE — 85027 COMPLETE CBC AUTOMATED: CPT

## 2024-07-23 PROCEDURE — 2780000003 HC OR 278 NO HCPCS

## 2024-07-23 PROCEDURE — 93005 ELECTROCARDIOGRAM TRACING: CPT

## 2024-07-23 PROCEDURE — A4648 IMPLANTABLE TISSUE MARKER: HCPCS

## 2024-07-23 PROCEDURE — 36415 COLL VENOUS BLD VENIPUNCTURE: CPT

## 2024-07-23 ASSESSMENT — PAIN SCALES - GENERAL: PAINLEVEL_OUTOF10: 0 - NO PAIN

## 2024-07-23 ASSESSMENT — DUKE ACTIVITY SCORE INDEX (DASI)
CAN YOU WALK INDOORS, SUCH AS AROUND YOUR HOUSE: YES
CAN YOU DO YARD WORK LIKE RAKING LEAVES, WEEDING OR PUSHING A MOWER: YES
CAN YOU PARTICIPATE IN STRENOUS SPORTS LIKE SWIMMING, SINGLES TENNIS, FOOTBALL, BASKETBALL, OR SKIING: YES
DASI METS SCORE: 8.9
CAN YOU DO MODERATE WORK AROUND THE HOUSE LIKE VACUUMING, SWEEPING FLOORS OR CARRYING GROCERIES: YES
CAN YOU CLIMB A FLIGHT OF STAIRS OR WALK UP A HILL: YES
TOTAL_SCORE: 50.2
CAN YOU WALK A BLOCK OR TWO ON LEVEL GROUND: YES
CAN YOU TAKE CARE OF YOURSELF (EAT, DRESS, BATHE, OR USE TOILET): YES
CAN YOU DO HEAVY WORK AROUND THE HOUSE LIKE SCRUBBING FLOORS OR LIFTING AND MOVING HEAVY FURNITURE: YES
CAN YOU DO LIGHT WORK AROUND THE HOUSE LIKE DUSTING OR WASHING DISHES: YES
CAN YOU PARTICIPATE IN MODERATE RECREATIONAL ACTIVITIES LIKE GOLF, BOWLING, DANCING, DOUBLES TENNIS OR THROWING A BASEBALL OR FOOTBALL: YES
CAN YOU HAVE SEXUAL RELATIONS: YES
CAN YOU RUN A SHORT DISTANCE: NO

## 2024-07-23 ASSESSMENT — ACTIVITIES OF DAILY LIVING (ADL): ADL_SCORE: 0

## 2024-07-23 ASSESSMENT — PAIN - FUNCTIONAL ASSESSMENT: PAIN_FUNCTIONAL_ASSESSMENT: 0-10

## 2024-07-23 ASSESSMENT — LIFESTYLE VARIABLES: SMOKING_STATUS: NONSMOKER

## 2024-07-23 NOTE — H&P (VIEW-ONLY)
CPM/PAT Evaluation       Name: Kavya Stout (Kavya Stout)  /Age: 1965/59 y.o.     In-Person       Chief Complaint: left breast atypical cells     HPI    KS is a 58 yo female who underwent routine mammogram back in 2024 when results showed abnormality in left breast.  She followed up with repeat imaging and biopsy which determined atypical ductal hyperplasia left breast putting her at high risk for breast cancer.  Subsequently she is now scheduled for left mag seed excisional breast biopsy.  Denies any breast swelling, drainage, or pains.  Overall she feels comfortable and has been doing well. Otherwise denies any recent illness, fever/chills, chest pains or shortness of breath.     Past Medical History:   Diagnosis Date    Abnormal EKG     Anxiety     Atypical ductal hyperplasia of left breast     Bipolar disorder (Multi)     BMI 40.0-44.9, adult (Multi)      PCP: EVA Gonzales CNP    Past Surgical History:   Procedure Laterality Date    BREAST BIOPSY      OTHER SURGICAL HISTORY      Electroconvulsive therapy    TONSILLECTOMY         Patient  reports that she is not currently sexually active.    Family History   Problem Relation Name Age of Onset    Breast cancer Mother      Cancer Father      Hypertension Father         No Known Allergies    Prior to Admission medications    Medication Sig Start Date End Date Taking? Authorizing Provider   lurasidone (Latuda) 80 mg tablet Take by mouth once daily.    Historical Provider, MD   propranolol (Inderal) 10 mg tablet Take by mouth. 13   Historical Provider, MD MINDI MACKEY   Constitutional: Negative for fever, chills, or sweats   ENMT: Negative for nasal discharge, congestion, ear pain, mouth pain, throat pain   Respiratory: Negative for cough, wheezing, shortness of breath   Cardiac: Negative for chest pain, dyspnea on exertion, palpitations   Gastrointestinal: Negative for nausea, vomiting, diarrhea, constipation, abdominal  pain  Genitourinary: Negative for dysuria, flank pain, frequency, hematuria   Musculoskeletal: Negative for decreased ROM, pain, swelling, weakness   Neurological: Negative for dizziness, confusion, headache  Psychiatric: Negative for mood changes   Skin: Negative for itching, rash, ulcer    Hematologic/Lymph: Negative for bruising, easy bleeding  Allergic/Immunologic: Negative itching, sneezing, swelling      Physical Exam  Constitutional:       Appearance: Normal appearance. She is obese.   HENT:      Head: Normocephalic.      Mouth/Throat:      Mouth: Mucous membranes are moist.   Eyes:      Extraocular Movements: Extraocular movements intact.   Cardiovascular:      Rate and Rhythm: Normal rate and regular rhythm.   Pulmonary:      Effort: Pulmonary effort is normal.      Breath sounds: Normal breath sounds.   Abdominal:      General: Abdomen is flat.      Palpations: Abdomen is soft.   Musculoskeletal:         General: Normal range of motion.      Cervical back: Normal range of motion.   Skin:     General: Skin is warm and dry.   Neurological:      General: No focal deficit present.      Mental Status: She is alert.   Psychiatric:         Mood and Affect: Mood normal.        PAT AIRWAY:   Airway:     Neck ROM::  Full   One missing tooth    Visit Vitals  /76   Pulse 81   Temp 37.1 °C (98.8 °F) (Temporal)   Resp 14       DASI Risk Score      Flowsheet Row Most Recent Value   DASI SCORE 50.2   METS Score (Will be calculated only when all the questions are answered) 8.9          Caprini DVT Assessment      Flowsheet Row Most Recent Value   DVT Score 9   Current Status Major surgery planned, lasting 2-3 hours   History Prior major surgery   Age 60-75 years   BMI 41-50 (Morbid obesity)          Modified Frailty Index      Flowsheet Row Most Recent Value   Modified Frailty Index Calculator 0          CHADS2 Stroke Risk  Current as of 13 minutes ago        N/A 3 to 100%: High Risk   2 to < 3%: Medium Risk   0 to <  2%: Low Risk     Last Change: N/A          This score determines the patient's risk of having a stroke if the patient has atrial fibrillation.        This score is not applicable to this patient. Components are not calculated.          Revised Cardiac Risk Index      Flowsheet Row Most Recent Value   Revised Cardiac Risk Calculator 0          Apfel Simplified Score    No data to display       Risk Analysis Index Results This Encounter         7/23/2024  1101             FOREMAN Cancer History: Patient does not indicate history of cancer    Total Risk Analysis Index Score Without Cancer: 18    Total Risk Analysis Index Score: 18          Stop Bang Score      Flowsheet Row Most Recent Value   Do you snore loudly? 1   Do you often feel tired or fatigued after your sleep? 0   Has anyone ever observed you stop breathing in your sleep? 0   Do you have or are you being treated for high blood pressure? 0   Recent BMI (Calculated) 43   Is BMI greater than 35 kg/m2? 1=Yes   Age older than 50 years old? 1=Yes   Is your neck circumference greater than 17 inches (Male) or 16 inches (Female)? 0   Gender - Male 0=No   STOP-BANG Total Score 3            Assessment and Plan:     Pre-Op  58 yo female scheduled for left mag seed excisional breast biopsy on 7/31/2024 with Dr. Jiang. Blood work ordered. EKG shows NSR with non specific T wave abnormalities in inferior and anterior leads, v rate of 77 bpm- no comparable EKG on file. Otherwise no further orders indicated.     Cardiac  -NSR with non specific T wave abnormalities in inferior and anterior leads, v rate of 77 bpm- no comparable EKG on file  RCI 0, 3.9% risk for postoperative MACE  DASI 50.2/ METS 8.9    OBYGN  Atypical ductal hyperplasia of left breast, reason for surgery    Psych  -Bipolar, taking Latuda, in addition to Propanolol to prevent adverse side effects from Latuda  -history of ECT treatment    Anesthesia:  Patient denies any anesthesia complications.   BMI  42.98    See risk scores as previously documented.

## 2024-07-23 NOTE — PREPROCEDURE INSTRUCTIONS
Medication List            Accurate as of July 23, 2024 11:22 AM. Always use your most recent med list.                lurasidone 80 mg tablet  Commonly known as: Latuda  Medication Adjustments for Surgery: Continue until night before surgery  Notes to patient: May take the morning of surgery if this medication is prescribed to take in the mornings     propranolol 10 mg tablet  Commonly known as: Inderal  Medication Adjustments for Surgery: Take morning of surgery with sip of water, no other fluids                  PRE-OPERATIVE INSTRUCTIONS    You will receive notification one business day prior to your procedure to confirm your arrival time. It is important that you answer your phone and/or check your messages during this time. If you do not hear from the surgery center by 5 pm. the day before your procedure, please call [phone].     Please enter the building through the Outpatient entrance and take the elevator off the lobby to the 2nd floor then check in at the Outpatient Surgery desk on the 2nd floor.    INSTRUCTIONS:  Talk to your surgeon for instructions if you should stop your aspirin, blood thinner, or diabetes medicines.  DO NOT take any multivitamins or over the counter supplements for 7-10 days before surgery.  If not being admitted, you must have an adult immediately available to drive you home after surgery. We also highly recommend you have someone stay with you for the entire day and night of your surgery.  For children having surgery, a parent or legal guardian must accompany them to the surgery center. If this is not possible, please call [phone] to make additional arrangements.  For adults who are unable to consent or make medical decisions for themselves, a legal guardian or Power of  must accompany them to the surgery center. If this is not possible, please call [phone] to make additional arrangements.  Wear comfortable, loose fitting clothing.  All jewelry and  piercings must be removed. If you are unable to remove an item or have a dermal piercing, please be sure to tell the nurse when you arrive for surgery.  Nail polish and make-up must be removed.  Avoid smoking or consuming alcohol for 24 hours before surgery.  To help prevent infection, please take a shower/bath and wash your hair the night before and/or morning of surgery (or follow other specific bathing instructions provided).    Preoperative Fasting Guidelines    Why must I stop eating and drinking near surgery time?  With sedation, food or liquid in your stomach can enter your lungs causing serious complications  Increases nausea and vomiting    When do I need to stop eating and drinking before my surgery?  Do not eat any solid food after midnight the night before your surgery/procedure unless otherwise instructed by your surgeon.   You may have up to 13.5 ounces of clear liquid until TWO hours before your instructed arrival time to the hospital.  This includes water, black tea/coffee, (no milk or cream) apple juice, and electrolyte drinks (Gatorade).   You may chew gum until TWO hours before your surgery/procedure      If applicable, notify your surgeons office immediately of any new skin changes that occur to the surgical limb.      If you have any questions or concerns, please call Pre-Admission Testing at [phone].       Home Preoperative Antibacterial Shower with Chlorhexidine gluconate (CHG)     What is a home preoperative antibacterial shower?  This shower is a way of cleaning the skin with a germ killing solution before surgery. The solution contains chlorhexidine gluconate, commonly known as CHG. CHG is a skin cleanser with germ killing ability. Let your doctor know if you are allergic to chlorhexidine.    Why do I need to take a preoperative antibacterial shower?  Skin is not sterile. It is best to try to make your skin as free of germs as possible before surgery. Proper cleansing with a germ  killing soap before surgery can lower the number of germs on your skin. This helps to reduce the risk of infection at the surgical site. Following the instructions listed below will help you prepare your skin for surgery.    How do I use the solution?  Begin using your CHG soap the night before and again the morning of your procedure.   Do not shave the day before or day of surgery.  Remove all jewelry until after surgery. Take off rings and take out all body-piercing jewelry.  Wash your face and hair with normal soap and shampoo before you use the CHG soap.  Apply the CHG solution to a clean wet washcloth. Move away from the water to avoid premature rinsing of the CHG soap as you are applying. Firmly lather your entire body from the neck down. Do not use CHG on your face, eyes, ears, or genitals.   Pay special attention to the area where your incisions will be located.  Do not scrub your skin too hard.  It is important to allow the CHG soap to sit on your skin for 3-5 minutes.  Rinse the solution off your body completely. Do not wash with your normal soap after the CHG soap solution.  Pat yourself dry with a clean, soft towel.  Do not apply powders, lotions or deodorants as these might block how the CHG soap works.   Dress in clean clothing.  Be sure to sleep with clean, freshly laundered sheets.  Be aware that CHG can cause stains on fabric. Rinse your washcloth and other linens that have contact with CHG completely. Use only non-chlorine detergents to launder the items used.

## 2024-07-23 NOTE — CPM/PAT H&P
CPM/PAT Evaluation       Name: Kavya Stout (Kavya Stout)  /Age: 1965/59 y.o.     In-Person       Chief Complaint: left breast atypical cells     HPI    KS is a 58 yo female who underwent routine mammogram back in 2024 when results showed abnormality in left breast.  She followed up with repeat imaging and biopsy which determined atypical ductal hyperplasia left breast putting her at high risk for breast cancer.  Subsequently she is now scheduled for left mag seed excisional breast biopsy.  Denies any breast swelling, drainage, or pains.  Overall she feels comfortable and has been doing well. Otherwise denies any recent illness, fever/chills, chest pains or shortness of breath.     Past Medical History:   Diagnosis Date    Abnormal EKG     Anxiety     Atypical ductal hyperplasia of left breast     Bipolar disorder (Multi)     BMI 40.0-44.9, adult (Multi)      PCP: EVA Gonzales CNP    Past Surgical History:   Procedure Laterality Date    BREAST BIOPSY      OTHER SURGICAL HISTORY      Electroconvulsive therapy    TONSILLECTOMY         Patient  reports that she is not currently sexually active.    Family History   Problem Relation Name Age of Onset    Breast cancer Mother      Cancer Father      Hypertension Father         No Known Allergies    Prior to Admission medications    Medication Sig Start Date End Date Taking? Authorizing Provider   lurasidone (Latuda) 80 mg tablet Take by mouth once daily.    Historical Provider, MD   propranolol (Inderal) 10 mg tablet Take by mouth. 13   Historical Provider, MD MINDI MACKEY   Constitutional: Negative for fever, chills, or sweats   ENMT: Negative for nasal discharge, congestion, ear pain, mouth pain, throat pain   Respiratory: Negative for cough, wheezing, shortness of breath   Cardiac: Negative for chest pain, dyspnea on exertion, palpitations   Gastrointestinal: Negative for nausea, vomiting, diarrhea, constipation, abdominal  pain  Genitourinary: Negative for dysuria, flank pain, frequency, hematuria   Musculoskeletal: Negative for decreased ROM, pain, swelling, weakness   Neurological: Negative for dizziness, confusion, headache  Psychiatric: Negative for mood changes   Skin: Negative for itching, rash, ulcer    Hematologic/Lymph: Negative for bruising, easy bleeding  Allergic/Immunologic: Negative itching, sneezing, swelling      Physical Exam  Constitutional:       Appearance: Normal appearance. She is obese.   HENT:      Head: Normocephalic.      Mouth/Throat:      Mouth: Mucous membranes are moist.   Eyes:      Extraocular Movements: Extraocular movements intact.   Cardiovascular:      Rate and Rhythm: Normal rate and regular rhythm.   Pulmonary:      Effort: Pulmonary effort is normal.      Breath sounds: Normal breath sounds.   Abdominal:      General: Abdomen is flat.      Palpations: Abdomen is soft.   Musculoskeletal:         General: Normal range of motion.      Cervical back: Normal range of motion.   Skin:     General: Skin is warm and dry.   Neurological:      General: No focal deficit present.      Mental Status: She is alert.   Psychiatric:         Mood and Affect: Mood normal.        PAT AIRWAY:   Airway:     Neck ROM::  Full   One missing tooth    Visit Vitals  /76   Pulse 81   Temp 37.1 °C (98.8 °F) (Temporal)   Resp 14       DASI Risk Score      Flowsheet Row Most Recent Value   DASI SCORE 50.2   METS Score (Will be calculated only when all the questions are answered) 8.9          Caprini DVT Assessment      Flowsheet Row Most Recent Value   DVT Score 9   Current Status Major surgery planned, lasting 2-3 hours   History Prior major surgery   Age 60-75 years   BMI 41-50 (Morbid obesity)          Modified Frailty Index      Flowsheet Row Most Recent Value   Modified Frailty Index Calculator 0          CHADS2 Stroke Risk  Current as of 13 minutes ago        N/A 3 to 100%: High Risk   2 to < 3%: Medium Risk   0 to <  2%: Low Risk     Last Change: N/A          This score determines the patient's risk of having a stroke if the patient has atrial fibrillation.        This score is not applicable to this patient. Components are not calculated.          Revised Cardiac Risk Index      Flowsheet Row Most Recent Value   Revised Cardiac Risk Calculator 0          Apfel Simplified Score    No data to display       Risk Analysis Index Results This Encounter         7/23/2024  1101             FOREMAN Cancer History: Patient does not indicate history of cancer    Total Risk Analysis Index Score Without Cancer: 18    Total Risk Analysis Index Score: 18          Stop Bang Score      Flowsheet Row Most Recent Value   Do you snore loudly? 1   Do you often feel tired or fatigued after your sleep? 0   Has anyone ever observed you stop breathing in your sleep? 0   Do you have or are you being treated for high blood pressure? 0   Recent BMI (Calculated) 43   Is BMI greater than 35 kg/m2? 1=Yes   Age older than 50 years old? 1=Yes   Is your neck circumference greater than 17 inches (Male) or 16 inches (Female)? 0   Gender - Male 0=No   STOP-BANG Total Score 3            Assessment and Plan:     Pre-Op  60 yo female scheduled for left mag seed excisional breast biopsy on 7/31/2024 with Dr. Jiang. Blood work ordered. EKG shows NSR with non specific T wave abnormalities in inferior and anterior leads, v rate of 77 bpm- no comparable EKG on file. Otherwise no further orders indicated.     Cardiac  -NSR with non specific T wave abnormalities in inferior and anterior leads, v rate of 77 bpm- no comparable EKG on file  RCI 0, 3.9% risk for postoperative MACE  DASI 50.2/ METS 8.9    OBYGN  Atypical ductal hyperplasia of left breast, reason for surgery    Psych  -Bipolar, taking Latuda, in addition to Propanolol to prevent adverse side effects from Latuda  -history of ECT treatment    Anesthesia:  Patient denies any anesthesia complications.   BMI  42.98    See risk scores as previously documented.

## 2024-07-23 NOTE — PREPROCEDURE INSTRUCTIONS
Preoperative Brain Exercises    What are brain exercises?  A brain exercise is any activity that engages your thinking (cognitive) skills.    What types of activities are considered brain exercises?  Jigsaw puzzles, crossword puzzles, word jumble, memory games, word search, and many more.  Many can be found free online or on your phone via a mobile yoon.    Why should I do brain exercises before my surgery?  More recent research has shown brain exercise before surgery can lower the risk of postoperative delirium (confusion) which can be especially important for older adults.  Patients who did brain exercises for 5 to 10 hours the days before surgery, cut their risk of postoperative delirium in half up to 1 week after surgery.

## 2024-07-31 ENCOUNTER — HOSPITAL ENCOUNTER (OUTPATIENT)
Facility: HOSPITAL | Age: 59
Setting detail: OUTPATIENT SURGERY
Discharge: HOME | End: 2024-07-31
Attending: SURGERY | Admitting: SURGERY
Payer: COMMERCIAL

## 2024-07-31 ENCOUNTER — ANESTHESIA EVENT (OUTPATIENT)
Dept: OPERATING ROOM | Facility: HOSPITAL | Age: 59
End: 2024-07-31
Payer: COMMERCIAL

## 2024-07-31 ENCOUNTER — HOSPITAL ENCOUNTER (OUTPATIENT)
Dept: RADIOLOGY | Facility: HOSPITAL | Age: 59
Discharge: HOME | End: 2024-07-31
Payer: COMMERCIAL

## 2024-07-31 ENCOUNTER — ANESTHESIA (OUTPATIENT)
Dept: OPERATING ROOM | Facility: HOSPITAL | Age: 59
End: 2024-07-31
Payer: COMMERCIAL

## 2024-07-31 VITALS
WEIGHT: 266 LBS | HEIGHT: 66 IN | BODY MASS INDEX: 42.75 KG/M2 | RESPIRATION RATE: 18 BRPM | TEMPERATURE: 98.2 F | DIASTOLIC BLOOD PRESSURE: 67 MMHG | SYSTOLIC BLOOD PRESSURE: 145 MMHG | OXYGEN SATURATION: 99 % | HEART RATE: 73 BPM

## 2024-07-31 DIAGNOSIS — N60.92 ATYPICAL DUCTAL HYPERPLASIA OF LEFT BREAST: ICD-10-CM

## 2024-07-31 PROBLEM — E66.813 CLASS 3 SEVERE OBESITY IN ADULT: Status: ACTIVE | Noted: 2024-07-31

## 2024-07-31 PROBLEM — E66.01 CLASS 3 SEVERE OBESITY IN ADULT (MULTI): Status: ACTIVE | Noted: 2024-07-31

## 2024-07-31 PROCEDURE — 2500000004 HC RX 250 GENERAL PHARMACY W/ HCPCS (ALT 636 FOR OP/ED): Performed by: STUDENT IN AN ORGANIZED HEALTH CARE EDUCATION/TRAINING PROGRAM

## 2024-07-31 PROCEDURE — A19125 PR EXCISE BREAST LES W XRAY MARKER: Performed by: ANESTHESIOLOGIST ASSISTANT

## 2024-07-31 PROCEDURE — 19125 EXCISION BREAST LESION: CPT | Performed by: SURGERY

## 2024-07-31 PROCEDURE — 3700000001 HC GENERAL ANESTHESIA TIME - INITIAL BASE CHARGE: Performed by: SURGERY

## 2024-07-31 PROCEDURE — 7100000010 HC PHASE TWO TIME - EACH INCREMENTAL 1 MINUTE: Performed by: SURGERY

## 2024-07-31 PROCEDURE — 2500000004 HC RX 250 GENERAL PHARMACY W/ HCPCS (ALT 636 FOR OP/ED): Performed by: SURGERY

## 2024-07-31 PROCEDURE — 88307 TISSUE EXAM BY PATHOLOGIST: CPT | Performed by: STUDENT IN AN ORGANIZED HEALTH CARE EDUCATION/TRAINING PROGRAM

## 2024-07-31 PROCEDURE — 2500000005 HC RX 250 GENERAL PHARMACY W/O HCPCS: Performed by: STUDENT IN AN ORGANIZED HEALTH CARE EDUCATION/TRAINING PROGRAM

## 2024-07-31 PROCEDURE — 88341 IMHCHEM/IMCYTCHM EA ADD ANTB: CPT | Performed by: STUDENT IN AN ORGANIZED HEALTH CARE EDUCATION/TRAINING PROGRAM

## 2024-07-31 PROCEDURE — A19125 PR EXCISE BREAST LES W XRAY MARKER: Performed by: STUDENT IN AN ORGANIZED HEALTH CARE EDUCATION/TRAINING PROGRAM

## 2024-07-31 PROCEDURE — 7100000001 HC RECOVERY ROOM TIME - INITIAL BASE CHARGE: Performed by: SURGERY

## 2024-07-31 PROCEDURE — 7100000002 HC RECOVERY ROOM TIME - EACH INCREMENTAL 1 MINUTE: Performed by: SURGERY

## 2024-07-31 PROCEDURE — 2500000004 HC RX 250 GENERAL PHARMACY W/ HCPCS (ALT 636 FOR OP/ED): Performed by: ANESTHESIOLOGIST ASSISTANT

## 2024-07-31 PROCEDURE — 2720000007 HC OR 272 NO HCPCS: Performed by: SURGERY

## 2024-07-31 PROCEDURE — 2500000005 HC RX 250 GENERAL PHARMACY W/O HCPCS: Performed by: ANESTHESIOLOGIST ASSISTANT

## 2024-07-31 PROCEDURE — 3600000007 HC OR TIME - EACH INCREMENTAL 1 MINUTE - PROCEDURE LEVEL TWO: Performed by: SURGERY

## 2024-07-31 PROCEDURE — 3600000002 HC OR TIME - INITIAL BASE CHARGE - PROCEDURE LEVEL TWO: Performed by: SURGERY

## 2024-07-31 PROCEDURE — 88341 IMHCHEM/IMCYTCHM EA ADD ANTB: CPT | Mod: TC,STJLAB | Performed by: SURGERY

## 2024-07-31 PROCEDURE — 88342 IMHCHEM/IMCYTCHM 1ST ANTB: CPT | Performed by: STUDENT IN AN ORGANIZED HEALTH CARE EDUCATION/TRAINING PROGRAM

## 2024-07-31 PROCEDURE — 3700000002 HC GENERAL ANESTHESIA TIME - EACH INCREMENTAL 1 MINUTE: Performed by: SURGERY

## 2024-07-31 PROCEDURE — 7100000009 HC PHASE TWO TIME - INITIAL BASE CHARGE: Performed by: SURGERY

## 2024-07-31 PROCEDURE — 76098 X-RAY EXAM SURGICAL SPECIMEN: CPT

## 2024-07-31 RX ORDER — BUPIVACAINE HYDROCHLORIDE 2.5 MG/ML
INJECTION, SOLUTION INFILTRATION; PERINEURAL AS NEEDED
Status: DISCONTINUED | OUTPATIENT
Start: 2024-07-31 | End: 2024-07-31 | Stop reason: HOSPADM

## 2024-07-31 RX ORDER — CEFAZOLIN SODIUM 2 G/100ML
INJECTION, SOLUTION INTRAVENOUS AS NEEDED
Status: DISCONTINUED | OUTPATIENT
Start: 2024-07-31 | End: 2024-07-31

## 2024-07-31 RX ORDER — FENTANYL CITRATE 50 UG/ML
INJECTION, SOLUTION INTRAMUSCULAR; INTRAVENOUS AS NEEDED
Status: DISCONTINUED | OUTPATIENT
Start: 2024-07-31 | End: 2024-07-31

## 2024-07-31 RX ORDER — SODIUM CHLORIDE, SODIUM LACTATE, POTASSIUM CHLORIDE, CALCIUM CHLORIDE 600; 310; 30; 20 MG/100ML; MG/100ML; MG/100ML; MG/100ML
INJECTION, SOLUTION INTRAVENOUS CONTINUOUS PRN
Status: DISCONTINUED | OUTPATIENT
Start: 2024-07-31 | End: 2024-07-31

## 2024-07-31 RX ORDER — PROPOFOL 10 MG/ML
INJECTION, EMULSION INTRAVENOUS AS NEEDED
Status: DISCONTINUED | OUTPATIENT
Start: 2024-07-31 | End: 2024-07-31

## 2024-07-31 RX ORDER — METOPROLOL TARTRATE 1 MG/ML
INJECTION, SOLUTION INTRAVENOUS AS NEEDED
Status: DISCONTINUED | OUTPATIENT
Start: 2024-07-31 | End: 2024-07-31

## 2024-07-31 RX ORDER — MEPERIDINE HYDROCHLORIDE 50 MG/ML
12.5 INJECTION INTRAMUSCULAR; INTRAVENOUS; SUBCUTANEOUS EVERY 10 MIN PRN
Status: DISCONTINUED | OUTPATIENT
Start: 2024-07-31 | End: 2024-08-01 | Stop reason: HOSPADM

## 2024-07-31 RX ORDER — MIDAZOLAM HYDROCHLORIDE 1 MG/ML
INJECTION, SOLUTION INTRAMUSCULAR; INTRAVENOUS AS NEEDED
Status: DISCONTINUED | OUTPATIENT
Start: 2024-07-31 | End: 2024-07-31

## 2024-07-31 RX ORDER — LIDOCAINE HYDROCHLORIDE 10 MG/ML
0.1 INJECTION INFILTRATION; PERINEURAL ONCE
Status: DISCONTINUED | OUTPATIENT
Start: 2024-07-31 | End: 2024-08-01 | Stop reason: HOSPADM

## 2024-07-31 RX ORDER — SODIUM CHLORIDE, SODIUM LACTATE, POTASSIUM CHLORIDE, CALCIUM CHLORIDE 600; 310; 30; 20 MG/100ML; MG/100ML; MG/100ML; MG/100ML
100 INJECTION, SOLUTION INTRAVENOUS CONTINUOUS
Status: DISCONTINUED | OUTPATIENT
Start: 2024-07-31 | End: 2024-08-01 | Stop reason: HOSPADM

## 2024-07-31 RX ORDER — LIDOCAINE HYDROCHLORIDE 20 MG/ML
INJECTION, SOLUTION EPIDURAL; INFILTRATION; INTRACAUDAL; PERINEURAL AS NEEDED
Status: DISCONTINUED | OUTPATIENT
Start: 2024-07-31 | End: 2024-07-31

## 2024-07-31 RX ORDER — KETOROLAC TROMETHAMINE 30 MG/ML
INJECTION, SOLUTION INTRAMUSCULAR; INTRAVENOUS AS NEEDED
Status: DISCONTINUED | OUTPATIENT
Start: 2024-07-31 | End: 2024-07-31

## 2024-07-31 RX ORDER — LABETALOL HYDROCHLORIDE 5 MG/ML
5 INJECTION, SOLUTION INTRAVENOUS ONCE AS NEEDED
Status: COMPLETED | OUTPATIENT
Start: 2024-07-31 | End: 2024-07-31

## 2024-07-31 RX ORDER — ONDANSETRON HYDROCHLORIDE 2 MG/ML
4 INJECTION, SOLUTION INTRAVENOUS ONCE AS NEEDED
Status: DISCONTINUED | OUTPATIENT
Start: 2024-07-31 | End: 2024-08-01 | Stop reason: HOSPADM

## 2024-07-31 RX ORDER — HYDROMORPHONE HYDROCHLORIDE 1 MG/ML
INJECTION, SOLUTION INTRAMUSCULAR; INTRAVENOUS; SUBCUTANEOUS AS NEEDED
Status: DISCONTINUED | OUTPATIENT
Start: 2024-07-31 | End: 2024-07-31

## 2024-07-31 RX ORDER — ALBUTEROL SULFATE 0.83 MG/ML
2.5 SOLUTION RESPIRATORY (INHALATION) ONCE AS NEEDED
Status: DISCONTINUED | OUTPATIENT
Start: 2024-07-31 | End: 2024-08-01 | Stop reason: HOSPADM

## 2024-07-31 RX ORDER — HYDROCODONE BITARTRATE AND ACETAMINOPHEN 5; 325 MG/1; MG/1
1 TABLET ORAL EVERY 6 HOURS PRN
Qty: 12 TABLET | Refills: 0 | Status: SHIPPED | OUTPATIENT
Start: 2024-07-31 | End: 2024-07-31

## 2024-07-31 RX ORDER — FENTANYL CITRATE 50 UG/ML
25 INJECTION, SOLUTION INTRAMUSCULAR; INTRAVENOUS EVERY 5 MIN PRN
Status: DISCONTINUED | OUTPATIENT
Start: 2024-07-31 | End: 2024-08-01 | Stop reason: HOSPADM

## 2024-07-31 RX ORDER — ONDANSETRON HYDROCHLORIDE 2 MG/ML
INJECTION, SOLUTION INTRAVENOUS AS NEEDED
Status: DISCONTINUED | OUTPATIENT
Start: 2024-07-31 | End: 2024-07-31

## 2024-07-31 RX ORDER — HYDROCODONE BITARTRATE AND ACETAMINOPHEN 5; 325 MG/1; MG/1
1 TABLET ORAL EVERY 6 HOURS PRN
Qty: 12 TABLET | Refills: 0 | Status: SHIPPED | OUTPATIENT
Start: 2024-07-31 | End: 2024-08-07

## 2024-07-31 RX ORDER — MIDAZOLAM HYDROCHLORIDE 1 MG/ML
1 INJECTION, SOLUTION INTRAMUSCULAR; INTRAVENOUS ONCE AS NEEDED
Status: DISCONTINUED | OUTPATIENT
Start: 2024-07-31 | End: 2024-08-01 | Stop reason: HOSPADM

## 2024-07-31 RX ORDER — OXYCODONE HYDROCHLORIDE 5 MG/1
5 TABLET ORAL EVERY 4 HOURS PRN
Status: DISCONTINUED | OUTPATIENT
Start: 2024-07-31 | End: 2024-08-01 | Stop reason: HOSPADM

## 2024-07-31 RX ADMIN — LABETALOL HYDROCHLORIDE 5 MG: 5 INJECTION, SOLUTION INTRAVENOUS at 13:04

## 2024-07-31 RX ADMIN — Medication 8 L/MIN: at 12:40

## 2024-07-31 SDOH — HEALTH STABILITY: MENTAL HEALTH: CURRENT SMOKER: 0

## 2024-07-31 ASSESSMENT — PAIN SCALES - GENERAL
PAINLEVEL_OUTOF10: 0 - NO PAIN
PAIN_LEVEL: 0

## 2024-07-31 ASSESSMENT — COLUMBIA-SUICIDE SEVERITY RATING SCALE - C-SSRS
6. HAVE YOU EVER DONE ANYTHING, STARTED TO DO ANYTHING, OR PREPARED TO DO ANYTHING TO END YOUR LIFE?: NO
1. IN THE PAST MONTH, HAVE YOU WISHED YOU WERE DEAD OR WISHED YOU COULD GO TO SLEEP AND NOT WAKE UP?: NO
2. HAVE YOU ACTUALLY HAD ANY THOUGHTS OF KILLING YOURSELF?: NO

## 2024-07-31 ASSESSMENT — PAIN - FUNCTIONAL ASSESSMENT
PAIN_FUNCTIONAL_ASSESSMENT: 0-10
PAIN_FUNCTIONAL_ASSESSMENT: UNABLE TO SELF-REPORT
PAIN_FUNCTIONAL_ASSESSMENT: 0-10
PAIN_FUNCTIONAL_ASSESSMENT: UNABLE TO SELF-REPORT

## 2024-07-31 NOTE — OP NOTE
Date: 2024  OR Location: [facility] OR    Name: Kavya Stout : 1965, Age: 59 y.o., MRN: [id number], Sex: female    Diagnosis  Pre-op Diagnosis      * Atypical ductal hyperplasia of left breast [N60.92] Post-op Diagnosis     * Atypical ductal hyperplasia of left breast [N60.92]     Procedures  LEFT MAG SEED LOCALIZED EXCISIONAL BREAST BIOPSY  11614 - PA EXC BREAST LES PREOP PLMT RAD MARKER OPEN 1 LES      Surgeons      * Vicki Jiang - Primary    Resident/Fellow/Other Assistant:  Surgeons and Role:  * No surgeons found with a matching role *    Procedure Summary  Anesthesia: General  ASA: ASA status not filed in the log.  Anesthesia Staff: Anesthesiologist: DO SANDRA Carlin-AA: NICO Sanchez  Estimated Blood Loss: 5mL    Staff:   Circulator: Xi Myers RN; Sonia Rey RN  Scrub Person: Angel Tres      Details of Procedure:    The patient underwent pre-operative magnetic seed localization in the radiology department prior to surgery.  Localization studies were reviewed confirming appropriate localization.  The patient was then transported to the operative suite where a sign-in was performed verifying patient identity, procedure and laterality.  One dose of preoperative antibiotics was given.  After induction of anesthesia the left breast and axilla were prepped and draped in the usual sterile fashion.  The sentimag probe was balanced and then used to identify the non-palpable lesion in the breast when the target area marked on the skin.  A periareolar incision was then made, and flaps were raised anteriorly in the direction of the targeted lesion.  Probe was used to confirm the presence of the lesion.  Circumferential dissection was carried out to include the targeted lesion and localization seed.  The probe was used to again confirm the presence of the localization seed within the specimen. Once the specimen was completed dissected it was oriented with a silk suture- short suture  superior, long suture lateral and passed off the field.  A specimen radiograph was performed which confirmed the presence of the lesion, biopsy clip, and localization seed.  Next, meticulous hemostasis was achieved.  The breast tissue was reapproximated in multiple layers to recreate the breast mound. The dermis was closed with 3-0 Vicryl suture and the skin was closed with a running 4-0 Monocryl.  Benzoin and steri-strips were used as dressing.  The patient was then awoken from anesthesia and transported to the PACU in satisfactory condition.    SPECIMEN:  1. Left breast magseed localized excisional biopsy- short suture superior, long lateral      Vicki Jiang DO

## 2024-07-31 NOTE — ANESTHESIA POSTPROCEDURE EVALUATION
Patient: Kavya Stout    Procedure Summary       Date: 07/31/24 Room / Location: [facility] OR 08 / Virtual [facility] OR    Anesthesia Start: 1130 Anesthesia Stop: 1241    Procedure: LEFT MAG SEED LOCALIZED EXCISIONAL BREAST BIOPSY (Left: Breast) Diagnosis:       Atypical ductal hyperplasia of left breast      (Atypical ductal hyperplasia of left breast [N60.92])    Surgeons: Vicki Jiang DO Responsible Provider: Neymar Guadalupe DO    Anesthesia Type: general ASA Status: 3            Anesthesia Type: general    Vitals Value Taken Time   /70 07/31/24 1336   Temp 36.5 °C (97.7 °F) 07/31/24 1240   Pulse 81 07/31/24 1338   Resp 17 07/31/24 1338   SpO2 92 % 07/31/24 1338   Vitals shown include unfiled device data.    Anesthesia Post Evaluation    Patient location during evaluation: PACU  Patient participation: complete - patient participated  Level of consciousness: awake  Pain score: 0  Pain management: adequate  Multimodal analgesia pain management approach  Airway patency: patent  Two or more strategies used to mitigate risk of obstructive sleep apnea  Cardiovascular status: acceptable  Respiratory status: acceptable  Hydration status: acceptable  Postoperative Nausea and Vomiting: none        No notable events documented.

## 2024-07-31 NOTE — ANESTHESIA PREPROCEDURE EVALUATION
Patient: Kavya Stout    Procedure Information       Anesthesia Start Date/Time: 07/31/24 1130    Procedure: LEFT MAG SEED LOCALIZED EXCISIONAL BREAST BIOPSY (Left)    Location: [facility] OR 08 / [facility]    Surgeons: Vicki Jiang DO          Vitals:    07/31/24 0823   BP: 139/76   Pulse: 82   Resp: 18   Temp: 36.9 °C (98.4 °F)   SpO2: 98%       Past Surgical History:   Procedure Laterality Date    BREAST BIOPSY      OTHER SURGICAL HISTORY      Electroconvulsive therapy    TONSILLECTOMY       Past Medical History:   Diagnosis Date    Abnormal EKG     Anxiety     Atypical ductal hyperplasia of left breast     Bipolar disorder (Multi)     BMI 40.0-44.9, adult (Multi)      No current facility-administered medications for this encounter.    Facility-Administered Medications Ordered in Other Encounters:     ceFAZolin (Ancef) in dextrose (iso) IV, , intravenous, PRN, April Glass, CAA, 2 g at 07/31/24 1137    dexAMETHasone (Decadron) injection, , intravenous, PRN, April Glass, CAA, 4 mg at 07/31/24 1137    fentaNYL PF (Sublimaze) injection, , intravenous, PRN, April Glass, CAA, 50 mcg at 07/31/24 1146    lactated Ringer's infusion, , intravenous, Continuous PRN, April Glass, CAA, New Bag at 07/31/24 1130    lidocaine PF (Xylocaine) 20 mg/mL (2 %) injection, , intravenous, PRN, April Glass, CAA, 100 mg at 07/31/24 1134    midazolam (Versed) injection, , intravenous, PRN, April Glass, CAA, 2 mg at 07/31/24 1129    propofol (Diprivan) injection, , intravenous, PRN, April Glass, CAA, 200 mg at 07/31/24 1134  Prior to Admission medications    Medication Sig Start Date End Date Taking? Authorizing Provider   lurasidone (Latuda) 80 mg tablet Take by mouth once daily.   Yes Historical Provider, MD   propranolol (Inderal) 10 mg tablet Take by mouth. 11/25/13  Yes Historical Provider, MD     No Known Allergies  Social History     Tobacco Use    Smoking status: Never    Smokeless tobacco: Never   Substance Use Topics  "   Alcohol use: Yes     Alcohol/week: 1.0 standard drink of alcohol     Types: 1 Glasses of wine per week     Comment: socially         Chemistry    Lab Results   Component Value Date/Time     07/23/2024 1136    K 4.5 07/23/2024 1136     07/23/2024 1136    CO2 31 07/23/2024 1136    BUN 14 07/23/2024 1136    CREATININE 0.88 07/23/2024 1136    Lab Results   Component Value Date/Time    CALCIUM 9.2 07/23/2024 1136    ALKPHOS 129 04/25/2024 1224    AST 21 04/25/2024 1224    ALT 34 04/25/2024 1224    BILITOT 0.7 04/25/2024 1224          Lab Results   Component Value Date/Time    WBC 8.9 07/23/2024 1136    HGB 14.3 07/23/2024 1136    HCT 44.5 07/23/2024 1136     07/23/2024 1136     No results found for: \"PROTIME\", \"PTT\", \"INR\"  Encounter Date: 07/23/24   ECG 12 lead (Ancillary Performed)   Result Value    Ventricular Rate 77    Atrial Rate 77    MD Interval 150    QRS Duration 88    QT Interval 384    QTC Calculation(Bazett) 434    P Axis 20    R Axis -12    T Axis 42    QRS Count 13    Q Onset 224    P Onset 149    P Offset 194    T Offset 416    QTC Fredericia 417    Narrative    Normal sinus rhythm  Low voltage QRS  Cannot rule out Anterior infarct (cited on or before 23-JUL-2024)  Abnormal ECG  When compared with ECG of 22-NOV-2011 10:59,  No significant change was found        Relevant Problems   Endocrine   (+) Class 3 severe obesity in adult (Multi)      GYN   (+) Abnormal uterine bleeding (AUB)       Clinical information reviewed:   Tobacco  Allergies  Meds   Med Hx  Surg Hx  OB Status  Fam Hx  Soc   Hx        NPO Detail:  NPO/Void Status  Carbohydrate Drink Given Prior to Surgery? : N  Date of Last Liquid: 07/30/24  Time of Last Liquid: 2200  Date of Last Solid: 07/30/24  Time of Last Solid: 2200  Last Intake Type: Food  Time of Last Void: 0825         Physical Exam    Airway  Mallampati: II  TM distance: >3 FB     Cardiovascular - normal exam  Rhythm: regular  Rate: normal     Dental - " normal exam     Pulmonary - normal exam     Abdominal - normal exam  Abdomen: soft             Anesthesia Plan    History of general anesthesia?: yes  History of complications of general anesthesia?: no    ASA 3     general     The patient is not a current smoker.  Patient was previously instructed to abstain from smoking on day of procedure.  Patient did not smoke on day of procedure.  Education provided regarding risk of obstructive sleep apnea.  intravenous induction   Postoperative administration of opioids is intended.  Anesthetic plan and risks discussed with patient.  Use of blood products discussed with patient who.

## 2024-07-31 NOTE — ANESTHESIA PROCEDURE NOTES
Airway  Date/Time: 7/31/2024 11:36 AM  Urgency: elective    Airway not difficult    Staffing  Performed: NICO   Authorized by: Neymar Guadalupe DO    Performed by: NICO Sanchez  Patient location during procedure: OR    Indications and Patient Condition  Indications for airway management: anesthesia  Spontaneous ventilation: present  Sedation level: deep  Preoxygenated: yes  Mask difficulty assessment: 0 - not attempted    Final Airway Details  Final airway type: supraglottic airway      Successful airway: Supraglottic airway: igel.  Size 4     Number of attempts at approach: 1

## 2024-07-31 NOTE — DISCHARGE INSTRUCTIONS
DR. KING'S BREAST SURGERY DISCHARGE INSTRUCTIONS    Wound Care    Do not remove the surgical bra for 24 hours.  Wear the bra to bed to reduce movement.  Your incisions are covered with steri strips.  Leave these in place until they begin to lift from the edges.  If steri strips are still in place after 10 days you may remove the strips.  You can place ice on your breast as needed for pain relief  (20 minutes on / 20 minutes off).    Activity    You may shower after 24 hours, but no baths, tubs, or swimming for 2 weeks.  Do not lift anything more than 10lbs or do any strenuous activity until seen in post op.  You may drive when you are not taking narcotic pain medication.    Medication    Take prescription  narcotic medication for severe, breakthrough pain.  Do not drive while taking narcotics.  You may take acetaminophen (Tylenol), ibuprofen (Motrin), or naproxen (Aleve) for pain.    When to Call    Your wound is red, swollen, or has a lot of bleeding or drainage.  Your pain is not controlled by the medicines.  You have a fever or 100F or greater.      Follow Up    Dr. King will review your pathology results at your post-op visit.  Pathology results may take up to 2 weeks or longer.  Results are available immediately on [employer] once finalized.  Please call the office at [phone] to make a follow up appointment.

## 2024-08-02 LAB
ATRIAL RATE: 77 BPM
P AXIS: 20 DEGREES
P OFFSET: 194 MS
P ONSET: 149 MS
PR INTERVAL: 150 MS
Q ONSET: 224 MS
QRS COUNT: 13 BEATS
QRS DURATION: 88 MS
QT INTERVAL: 384 MS
QTC CALCULATION(BAZETT): 434 MS
QTC FREDERICIA: 417 MS
R AXIS: -12 DEGREES
T AXIS: 42 DEGREES
T OFFSET: 416 MS
VENTRICULAR RATE: 77 BPM

## 2024-08-07 ENCOUNTER — TELEPHONE (OUTPATIENT)
Dept: SURGICAL ONCOLOGY | Facility: HOSPITAL | Age: 59
End: 2024-08-07
Payer: COMMERCIAL

## 2024-08-07 NOTE — TELEPHONE ENCOUNTER
Called to check in on patient post-operatively. Left detailed message to contact the office with any concerns.

## 2024-08-15 LAB
LAB AP ASR DISCLAIMER: NORMAL
LABORATORY COMMENT REPORT: NORMAL
PATH REPORT.FINAL DX SPEC: NORMAL
PATH REPORT.GROSS SPEC: NORMAL
PATH REPORT.RELEVANT HX SPEC: NORMAL
PATH REPORT.TOTAL CANCER: NORMAL
RESIDENT REVIEW: NORMAL

## 2024-08-26 NOTE — PROGRESS NOTES
BREAST SURGERY POST OPERATIVE VISIT    Assessment/Plan     Left breast ADH  -s/p surgical excision    2.   Probably benign left breast complicated cyst   -6 month follow up U/S recommended.      We reviewed the pathology results from surgery and the patient was provided with a copy of the pathology report.    Atypia only on surgical pathology.    Follow up with high risk clinic for ongoing screening in high risk clinic.      Subjective   Kavya Stout is a 59 y.o. female here for post op visit s/p left breast excisional biopsy.    Date of surgery: 7/31/2024  Pathology     FINAL DIAGNOSIS     A. LEFT BREAST, MAGSEED LOCALIZED EXCISIONAL BIOPSY:   --ATYPICAL DUCTAL HYPERPLASIA WITH MICROCALCIFICATIONS  --COLUMNAR CELL CHANGE AND HYPERPLASIA  --PREVIOUS BIOPSY CAVITY AND ASSOCIATED CHANGES ARE IDENTIFIED  --SEE NOTE       Objective   Physical Exam  General: Alert and oriented x 3.  Mood and affect are appropriate.  HEENT: EOMI, PERRLA.   Neck: supple, no masses, no cervical adenopathy.  Cardiovascular: no lower extremity edema.  Pulmonary: breathing non labored on room air.  GI: Abdomen soft, no masses. No hepatomegaly or splenomegaly.  Lymph nodes: No supraclavicular or axillary adenopathy bilaterally.  Musculoskeletal: Full range of motion in the upper extremities bilaterally.  Neuro: denies dizziness, tremors    Breasts: The breasts were examined in both the seated and supine positions.    RIGHT: The nipple is everted without nipple discharge.  There are no skin changes, skin thickening, or dimpling. There are no masses palpated in the RIGHT breast.   LEFT: The nipple is everted without nipple discharge.  There are no skin changes, skin thickening, or dimpling. There are no masses palpated in the LEFT breast. Healing periareolar incision.    Radiology review: All images and reports were personally reviewed.         Vicki Jiang DO

## 2024-08-27 ENCOUNTER — OFFICE VISIT (OUTPATIENT)
Dept: SURGICAL ONCOLOGY | Facility: CLINIC | Age: 59
End: 2024-08-27
Payer: COMMERCIAL

## 2024-08-27 DIAGNOSIS — N60.92 ATYPICAL DUCTAL HYPERPLASIA OF LEFT BREAST: Primary | ICD-10-CM

## 2024-08-27 PROCEDURE — 99211 OFF/OP EST MAY X REQ PHY/QHP: CPT | Performed by: SURGERY

## 2024-11-14 ENCOUNTER — OFFICE VISIT (OUTPATIENT)
Dept: SURGICAL ONCOLOGY | Facility: HOSPITAL | Age: 59
End: 2024-11-14
Payer: COMMERCIAL

## 2024-11-14 VITALS
RESPIRATION RATE: 16 BRPM | SYSTOLIC BLOOD PRESSURE: 153 MMHG | HEART RATE: 84 BPM | WEIGHT: 261 LBS | DIASTOLIC BLOOD PRESSURE: 81 MMHG | BODY MASS INDEX: 41.95 KG/M2 | HEIGHT: 66 IN

## 2024-11-14 DIAGNOSIS — Z12.39 BREAST CANCER SCREENING, HIGH RISK PATIENT: Primary | ICD-10-CM

## 2024-11-14 DIAGNOSIS — N60.92 ATYPICAL DUCTAL HYPERPLASIA OF LEFT BREAST: ICD-10-CM

## 2024-11-14 PROCEDURE — 1036F TOBACCO NON-USER: CPT | Performed by: NURSE PRACTITIONER

## 2024-11-14 PROCEDURE — 99214 OFFICE O/P EST MOD 30 MIN: CPT | Performed by: NURSE PRACTITIONER

## 2024-11-14 PROCEDURE — 3008F BODY MASS INDEX DOCD: CPT | Performed by: NURSE PRACTITIONER

## 2024-11-14 NOTE — PROGRESS NOTES
[facility]  Kavya Stout female   1965 59 y.o.  [id number]      Chief Complaint  Follow up high risk evaluation.    History Of Present Illness  Kavya Stout is a very pleasant 59 y.o.  female for high risk evaluation. On 2024 she had a left breast surgical excision for atypical ductal hyperplasia (ADH) on core biopsy. She has a history of a benign right breast core biopsy with FEA. She has family history of breast cancer in her mother, age 60+. She has a follow up in December for and ultrasound for a probably benign left breast complicated cyst.    REPRODUCTIVE HISTORY:  menarche age 14, , first birth age 26,  x 21 months, OCP's x 4-5 years, natural menopause age 53, no HRT, scattered fibroglandular tissue    FAMILY CANCER HISTORY:   Mother: Breast cancer, age 60+    Review of Systems  Constitutional:  Negative for appetite change, fatigue, fever and unexpected weight change.   HENT:  Negative for ear pain, hearing loss, nosebleeds, sore throat and trouble swallowing.    Eyes:  Negative for discharge, itching and visual disturbance.   Breast: As stated in HPI.  Respiratory:  Negative for cough, chest tightness and shortness of breath.    Cardiovascular:  Negative for chest pain, palpitations and leg swelling.   Gastrointestinal:  Negative for abdominal pain, constipation, diarrhea and nausea.   Endocrine: Negative for cold intolerance and heat intolerance.   Genitourinary:  Negative for dysuria, frequency, hematuria, pelvic pain and vaginal bleeding.   Musculoskeletal:  Negative for arthralgias, back pain, gait problem, joint swelling and myalgias.   Skin:  Negative for color change and rash.   Allergic/Immunologic: Negative for environmental allergies and food allergies.   Neurological:  Negative for dizziness, tremors, speech difficulty, weakness, numbness and headaches.   Hematological:  Does not bruise/bleed easily.   Psychiatric/Behavioral:  Negative for  agitation, dysphoric mood and sleep disturbance. The patient is not nervous/anxious.         Past Medical History  She has a past medical history of Abnormal EKG, Anxiety, Atypical ductal hyperplasia of left breast, Bipolar disorder, and BMI 40.0-44.9, adult (Multi).    Surgical History  She has a past surgical history that includes Breast biopsy; Tonsillectomy; Other surgical history; and Breast biopsy (Left, 7/31/2024).    Family History  Cancer-related family history includes Breast cancer in her mother; Cancer in her father.     Social History  She reports that she has never smoked. She has never used smokeless tobacco. She reports current alcohol use of about 1.0 standard drink of alcohol per week. She reports that she does not use drugs.    Allergies  Patient has no known allergies.    Medications  Current Outpatient Medications   Medication Instructions    lurasidone (Latuda) 80 mg tablet Daily RT    propranolol (Inderal) 10 mg tablet Take by mouth.       Last Recorded Vitals  Vitals:    11/14/24 0906   BP: 153/81   Pulse: 84   Resp: 16         Physical Exam  Chest:            Patient is alert and oriented x3 and in a relaxed and appropriate mood. Her gait is steady and hand grasps are equal. Sclera is clear. The breasts are nearly symmetrical. Left breast has a well healed partial circumareolar incision. The tissue is soft without palpable abnormalities, discrete nodules or masses. The skin and nipples appear normal. There is no cervical, supraclavicular or axillary lymphadenopathy.        Relevant Results and Imaging  None     Risk Models        Visit Diagnosis  1. Breast cancer screening, high risk patient  Clinic Appointment Request Follow Up    BI mammo bilateral screening tomosynthesis      2. Atypical ductal hyperplasia of left breast            Assessment/Plan  High risk surveillance care, normal clinical exam, probably benign left breast mass, hx left excisional biopsy ADH, history benign right core  biopsy with GABRIEL, family history of breast cancer, scattered fibroglandular tissue    Plan:  Return  May 2025 for bilateral screening mammogram with office visit. Proceed with left breast ultrasound 12/24. She is interested in full breast MRI and will start these November 2025. Discussed and recommend endocrine therapy. She declines at this time.     Patient Discussion/Summary  Your clinical examination is normal. Please return in May 2025 for mammogram and office visit or sooner if you have any problems or concerns.     High risk breast surveillance care plan:  Yearly mammogram with digital breast tomosynthesis  Twice yearly clinical breast examinations  Breast MRI (to schedule call [phone])  Monthly self breast examinations &/or regular self breast awareness  Vitamin D3 2000 IU/daily (over the counter) unless your PCP recommends you take a specific dose  Exercise 3-4 times per week for 45-60 minutes  Limit alcohol to 3-4 drinks per week if you are menopausal  Eat healthy low-fat diet with lots of vegetable & fruits  Risk model indicates you are eligible for endocrine therapy with Tamoxifen, Raloxifene or Aromatase inhibitor. Endocrine therapy reduces lifetime risk of breast cancer by up to 50% when taken for 5 years. There is an alternative low dose Tamoxifen which can be taken for only 3 years.      IMPORTANT INFORMATION REGARDING YOUR RESULTS    If you receive medical information from My [facility] Personal Health Record (online chart) your results will be released into your chart. This means you may view or see results of your biopsy or procedure before I contact you directly. If this occurs, please call the office and we will discuss your results over the phone.    You can see your health information, review clinical summaries from office visits & test results online when you follow your health with MY  Chart, a personal health record. To sign up go to [email or web address]. If you need assistance  with signing up or trouble getting into your account call [employer] Patient Line 24/7 at [phone].    My office phone number is [phone]  if you need to get in touch with me or have additional questions or concerns. Thank you for choosing [facility] and trusting me as your healthcare provider. I look forward to seeing you again at your next office visit. I am honored to be a provider on your health care team and I remain dedicated to helping you achieve your health goals.       Tracy Timmons, APRN-CNP

## 2024-12-17 ENCOUNTER — APPOINTMENT (OUTPATIENT)
Dept: RADIOLOGY | Facility: HOSPITAL | Age: 59
End: 2024-12-17
Payer: COMMERCIAL

## 2025-05-08 ENCOUNTER — APPOINTMENT (OUTPATIENT)
Dept: SURGICAL ONCOLOGY | Facility: HOSPITAL | Age: 60
End: 2025-05-08
Payer: COMMERCIAL

## 2025-05-08 ENCOUNTER — APPOINTMENT (OUTPATIENT)
Dept: RADIOLOGY | Facility: HOSPITAL | Age: 60
End: 2025-05-08
Payer: COMMERCIAL

## (undated) DEVICE — GLOVE, SURGICAL, PROTEXIS PI , 5.5, PF, LF

## (undated) DEVICE — SUTURE, VICRYL, 3-0, 27 IN, SH

## (undated) DEVICE — SOLUTION, IRRIGATION, STERILE WATER, 1000 ML, POUR BOTTLE

## (undated) DEVICE — SUTURE, VICRYL, 2-0, 27 IN, SH, UNDYED

## (undated) DEVICE — SOLUTION, IRRIGATION, SODIUM CHLORIDE 0.9%, 1000 ML, POUR BOTTLE

## (undated) DEVICE — MARKER, SKIN, DUAL TIP, W/RULER AND LABEL

## (undated) DEVICE — TOWEL PACK, STERILE, 4/PACK, BLUE

## (undated) DEVICE — DRESSING, TRANSPARENT, TEGADERM, 4 X 4-3/4 IN

## (undated) DEVICE — COVER, MAYO STAND, W/PAD, 23 IN, DISPOSABLE, PLASTIC, LF, STERILE

## (undated) DEVICE — APPLICATOR, CHLORAPREP, W/ORANGE TINT, 26ML

## (undated) DEVICE — SUTURE, MONOCRYL, 4-0, 27 IN, PS-2, UNDYED

## (undated) DEVICE — SUPPORT, MAMMARY, THERAPEUTIC, SURGI-BRA, LARGE, LF

## (undated) DEVICE — Device

## (undated) DEVICE — STRIP, SKIN CLOSURE, STERI STRIP, REINFORCED, 0.5 X 4 IN

## (undated) DEVICE — PROBE COVER, INTRAOPERATIVE, 13 X 244CM (5 X 96IN)